# Patient Record
Sex: FEMALE | Race: WHITE | URBAN - METROPOLITAN AREA
[De-identification: names, ages, dates, MRNs, and addresses within clinical notes are randomized per-mention and may not be internally consistent; named-entity substitution may affect disease eponyms.]

---

## 2017-12-04 DIAGNOSIS — Z00.00 WELL WOMAN EXAM (NO GYNECOLOGICAL EXAM): Primary | ICD-10-CM

## 2017-12-14 ENCOUNTER — TELEPHONE (OUTPATIENT)
Dept: FAMILY MEDICINE CLINIC | Age: 52
End: 2017-12-14

## 2018-01-02 DIAGNOSIS — Z78.0 MENOPAUSE: ICD-10-CM

## 2018-01-03 DIAGNOSIS — Z00.00 WELL WOMAN EXAM (NO GYNECOLOGICAL EXAM): ICD-10-CM

## 2018-02-22 ENCOUNTER — OFFICE VISIT (OUTPATIENT)
Dept: FAMILY MEDICINE CLINIC | Age: 53
End: 2018-02-22

## 2018-02-22 VITALS
OXYGEN SATURATION: 99 % | HEART RATE: 59 BPM | TEMPERATURE: 98 F | RESPIRATION RATE: 17 BRPM | BODY MASS INDEX: 25.77 KG/M2 | HEIGHT: 69 IN | WEIGHT: 174 LBS | SYSTOLIC BLOOD PRESSURE: 134 MMHG | DIASTOLIC BLOOD PRESSURE: 67 MMHG

## 2018-02-22 DIAGNOSIS — Z78.0 MENOPAUSE: ICD-10-CM

## 2018-02-22 DIAGNOSIS — R09.82 POSTNASAL DRIP: ICD-10-CM

## 2018-02-22 DIAGNOSIS — Z23 ENCOUNTER FOR IMMUNIZATION: ICD-10-CM

## 2018-02-22 DIAGNOSIS — E66.3 OVERWEIGHT (BMI 25.0-29.9): ICD-10-CM

## 2018-02-22 DIAGNOSIS — Z00.00 WELL WOMAN EXAM (NO GYNECOLOGICAL EXAM): Primary | ICD-10-CM

## 2018-02-22 RX ORDER — FLUTICASONE PROPIONATE 50 MCG
1 SPRAY, SUSPENSION (ML) NASAL DAILY
Qty: 3 BOTTLE | Refills: 3 | Status: SHIPPED | OUTPATIENT
Start: 2018-02-22 | End: 2019-05-21

## 2018-02-22 RX ORDER — FLUTICASONE PROPIONATE 50 MCG
1 SPRAY, SUSPENSION (ML) NASAL DAILY
Qty: 3 BOTTLE | Refills: 3 | Status: SHIPPED | OUTPATIENT
Start: 2018-02-22 | End: 2018-02-22 | Stop reason: SDUPTHER

## 2018-02-22 NOTE — PROGRESS NOTES
Chief Complaint   Patient presents with    Annual Wellness Visit     No PAP    Medication Refill     Premarin        Pt is a 47y.o. year old female who presents for follow up of her chronic medical problems    Health Maintenance Due   Topic Date Due    DTaP/Tdap/Td series (1 - Tdap) 04/03/1985    Influenza Age 5 to Adult  08/01/2017-today     BMI 25  Wt Readings from Last 3 Encounters:   02/22/18 174 lb (78.9 kg)   11/21/16 170 lb (77.1 kg)   02/17/16 175 lb 6.4 oz (79.6 kg)     PAP done 2016, normal-due 2019  mammo due 5/2018    Fasting today for labs-labs done 12/2017 all normal; discussed  Lipid Complete Panel12/8/2017  [address]  Component Name Value Ref Range   Cholesterol 188 110 - 200 mg/dL   Triglyceride 75 40 - 149 mg/dL   HDL 86 (H) 40 - 59 mg/dL   LDL CALCULATION 87 50 - 99 mg/dL   VLDL CALCULATION 15        Colonoscopy up date    Needs refill on Premarin-needs to take it still, helps a lot with night sweats    On ASA for hx of TIA    New problems? Seeing PT for left LE pain  Continues to exercise regularly    Clearing her throat despite daily Zyrtec  Used to do Flonase      ROS:    Pt denies: Wt loss, Fever/Chills, HA, Visual changes, Fatigue, Chest pain, SOB, ZIMMERMAN, Abd pain, N/V/D/C, Blood in stool or urine, Edema. Pertinent positive as above in HPI. All others were negative    Patient Active Problem List   Diagnosis Code    Bursitis of shoulder, left M75.52    Stress fracture of ankle M84.373A    Hyperlipidemia E78.5    TIA (transient ischemic attack) G45.9    Rotator cuff syndrome of right shoulder M75. 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       Past Medical History:   Diagnosis Date    Arthritis     finger joints    Contact dermatitis and other eczema, due to unspecified cause     Hyperlipidemia 11/11/2013    Seizures ([address]     25 yrs ago / random    TIA (transient ischemic attack) 11/11/2013       Current Outpatient Prescriptions Medication Sig Dispense Refill    conjugated estrogens (PREMARIN) 0.625 mg tablet TAKE 1 TABLET DAILY 90 Tab 1    Cetirizine (ZYRTEC) 10 mg cap Take  by mouth.  melatonin 1 mg tablet Take 8 mg by mouth.  aspirin delayed-release 81 mg tablet Take  by mouth daily. History   Smoking Status    Former Smoker    Packs/day: 0.30    Years: 30.00    Types: Cigarettes   Smokeless Tobacco    Never Used       No Known Allergies    Patient Labs were reviewed: yes      Patient Past Records were reviewed:  yes        Objective:     Vitals:    02/22/18 0910   BP: 134/67   Pulse: (!) 59   Resp: 17   Temp: 98 °F (36.7 °C)   TempSrc: Oral   SpO2: 99%   Weight: 174 lb (78.9 kg)   Height: 5' 9\" (1.753 m)     Body mass index is 25.7 kg/(m^2). Exam:   Appearance: alert, well appearing,  oriented to person, place, and time, acyanotic, in no respiratory distress and well hydrated. HEENT:  NC/AT, pink conj, anicteric sclerae  Neck:  No cervical lymphadenopathy, no JVD, no thyromegaly, no carotid bruit  Heart:  RRR without M/R/G  Lungs:  CTAB, no rhonchi, rales, or wheezes with good air exchange   Abdomen:  Non-tender, pos bowel sounds, no hepatosplenomegaly  Ext:  No C/C/E    Skin: no rash  Neuro: no lateralizing signs, CNs II-XII intact      Assessment/ Plan:   Diagnoses and all orders for this visit:    1. Well woman exam (no gynecological exam)-Advised re: monthly self breast exam, dental prophylaxis Q 6 months, regular exercise, yearly eye exam, daily intake of Ca+D    2. Postnasal drip  -     fluticasone (FLONASE) 50 mcg/actuation nasal spray; 1 [place] by Both Nostrils route daily. 3. Overweight (BMI 25.0-29. 9)-continue with regular exercise    4.  Menopause  -     conjugated estrogens (PREMARIN) 0.625 mg tablet; TAKE 1 TABLET DAILY        Follow-up Disposition:  Return in about 1 year (around 2/22/2019) for physical.        I have discussed the diagnosis with the patient and the intended plan as seen in the above orders. The patient has received an After-Visit Summary and questions were answered concerning future plans. Medication Side Effects and Warnings were discussed with patient: yes    Patient verbalized understanding of above instructions.     Valentin Shannon MD  Internal Medicine  [facility]

## 2018-02-22 NOTE — PATIENT INSTRUCTIONS
Well Visit, Women 48 to 72: Care Instructions  Your Care Instructions    Physical exams can help you stay healthy. Your doctor has checked your overall health and may have suggested ways to take good care of yourself. He or she also may have recommended tests. At home, you can help prevent illness with healthy eating, regular exercise, and other steps. Follow-up care is a key part of your treatment and safety. Be sure to make and go to all appointments, and call your doctor if you are having problems. It's also a good idea to know your test results and keep a list of the medicines you take. How can you care for yourself at home? · Reach and stay at a healthy weight. This will lower your risk for many problems, such as obesity, diabetes, heart disease, and high blood pressure. · Get at least 30 minutes of exercise on most days of the week. Walking is a good choice. You also may want to do other activities, such as running, swimming, cycling, or playing tennis or team sports. · Do not smoke. Smoking can make health problems worse. If you need help quitting, talk to your doctor about stop-smoking programs and medicines. These can increase your chances of quitting for good. · Protect your skin from too much sun. When you're outdoors from 10 a.m. to 4 p.m., stay in the shade or cover up with clothing and a hat with a wide brim. Wear sunglasses that block UV rays. Even when it's cloudy, put broad-spectrum sunscreen (SPF 30 or higher) on any exposed skin. · See a dentist one or two times a year for checkups and to have your teeth cleaned. · Wear a seat belt in the car. · Limit alcohol to 1 drink a day. Too much alcohol can cause health problems. Follow your doctor's advice about when to have certain tests. These tests can spot problems early. · Cholesterol.  Your doctor will tell you how often to have this done based on your age, family history, or other things that can increase your risk for heart attack and stroke. · Blood pressure. Have your blood pressure checked during a routine doctor visit. Your doctor will tell you how often to check your blood pressure based on your age, your blood pressure results, and other factors. · Mammogram. Ask your doctor how often you should have a mammogram, which is an X-ray of your breasts. A mammogram can spot breast cancer before it can be felt and when it is easiest to treat. · Pap test and pelvic exam. Ask your doctor how often you should have a Pap test. You may not need to have a Pap test as often as you used to. · Vision. Have your eyes checked every year or two or as often as your doctor suggests. Some experts recommend that you have yearly exams for glaucoma and other age-related eye problems starting at age 48. · Hearing. Tell your doctor if you notice any change in your hearing. You can have tests to find out how well you hear. · Diabetes. Ask your doctor whether you should have tests for diabetes. · Colon cancer. You should begin tests for colon cancer at age 48. You may have one of several tests. Your doctor will tell you how often to have tests based on your age and risk. Risks include whether you already had a precancerous polyp removed from your colon or whether your parents, sisters and brothers, or children have had colon cancer. · Thyroid disease. Talk to your doctor about whether to have your thyroid checked as part of a regular physical exam. Women have an increased chance of a thyroid problem. · Osteoporosis. You should begin tests for bone density at age 72. If you are younger than 72, ask your doctor whether you have factors that may increase your risk for this disease. You may want to have this test before age 72. · Heart attack and stroke risk. At least every 4 to 6 years, you should have your risk for heart attack and stroke assessed.  Your doctor uses factors such as your age, blood pressure, cholesterol, and whether you smoke or have diabetes to show what your risk for a heart attack or stroke is over the next 10 years. When should you call for help? Watch closely for changes in your health, and be sure to contact your doctor if you have any problems or symptoms that concern you. Where can you learn more? Go to http://guilherme-maximus.info/. Enter T534 in the search box to learn more about \"Well Visit, Women 50 to 72: Care Instructions. \"  Current as of: May 12, 2017  Content Version: 11.4  © 0085-0734 Healthwise, Incorporated. Care instructions adapted under license by [employer] (which disclaims liability or warranty for this information). If you have questions about a medical condition or this instruction, always ask your healthcare professional. [address] any warranty or liability for your use of this information.

## 2018-02-22 NOTE — PROGRESS NOTES
Chief Complaint   Patient presents with    Annual Wellness Visit     No PAP    Medication Refill     Premarin      1. Have you been to the ER, urgent care clinic since your last visit? Hospitalized since your last visit? No    2. Have you seen or consulted any other health care providers outside of the [address] [place] [address] since your last visit? Include any pap smears or colon screening.  Yes Where: Dr. Linda Reyes Reason for visit: [facility]  [facility]

## 2018-02-22 NOTE — MR AVS SNAPSHOT
Sameer Copeland [place] [address] [facility] [address] [facility] [address] [id number] 
[phone] Patient: Tiffanie Limon MRN: [id number] DCJ:[id number] Visit Information Date & Time Provider Department Dept. Phone Encounter #  
 2/22/2018  9:00 AM Uyen Gilbert MD [facility] [address] [id number] Follow-up Instructions Return in about 1 year (around 2/22/2019) for physical.  
  
Upcoming Health Maintenance Date Due DTaP/Tdap/Td series (1 - Tdap) 4/3/1985 Influenza Age 5 to Adult 8/1/2017 BREAST CANCER SCRN MAMMOGRAM 5/15/2019 PAP AKA CERVICAL CYTOLOGY 11/21/2019 COLONOSCOPY 4/15/2020 Allergies as of 2/22/2018  Review Complete On: 2/22/2018 By: Uyen Gilbert MD  
 No Known Allergies Current Immunizations  Reviewed on 2/22/2018 Name Date Influenza Vaccine 1/30/2013 Influenza Vaccine (Quad) PF 11/21/2016 Influenza Vaccine PF 11/11/2013 Reviewed by Uyen Gilbert MD on 2/22/2018 at  9:38 AM  
You Were Diagnosed With   
  
 Codes Comments Well woman exam (no gynecological exam)    -  Primary ICD-10-CM: Z00.00 ICD-9-CM: V70.0 Postnasal drip     ICD-10-CM: R09.82 ICD-9-CM: 784.91 Overweight (BMI 25.0-29. 9)     ICD-10-CM: I32.9 ICD-9-CM: 278.02 Menopause     ICD-10-CM: Z78.0 ICD-9-CM: 627.2 Vitals BP Pulse Temp Resp Height(growth percentile) Weight(growth percentile) 134/67 (!) 59 98 °F (36.7 °C) (Oral) 17 5' 9\" (1.753 m) 174 lb (78.9 kg) LMP SpO2 BMI OB Status Smoking Status 11/16/2006 99% 25.7 kg/m2 Hysterectomy Former Smoker Vitals History BMI and BSA Data Body Mass Index Body Surface Area 25.7 kg/m 2 1.96 m 2 Preferred Pharmacy Pharmacy Name Phone [address] [phone] Your Updated Medication List  
  
   
This list is accurate as of 2/22/18  9:50 AM.  Always use your most recent med list.  
  
  
  
 aspirin delayed-release 81 mg tablet Take  by mouth daily. conjugated estrogens 0.625 mg tablet Commonly known as:  PREMARIN  
TAKE 1 TABLET DAILY  
  
 fluticasone 50 mcg/actuation nasal spray Commonly known as:  FLONASE  
1 [place] by Both Nostrils route daily. melatonin 1 mg tablet Take 8 mg by mouth. ZyrTEC 10 mg Cap Generic drug:  Cetirizine Take  by mouth. Prescriptions Printed Refills  
 conjugated estrogens (PREMARIN) 0.625 mg tablet 1 Sig: TAKE 1 TABLET DAILY Class: Print  
 fluticasone (FLONASE) 50 mcg/actuation nasal spray 3 [place] by Both Nostrils route daily. Class: Print Route: Both Nostrils Follow-up Instructions Return in about 1 year (around 2019) for physical.  
  
  
Patient Instructions Well Visit, Women 48 to 72: Care Instructions Your Care Instructions Physical exams can help you stay healthy. Your doctor has checked your overall health and may have suggested ways to take good care of yourself. He or she also may have recommended tests. At home, you can help prevent illness with healthy eating, regular exercise, and other steps. Follow-up care is a key part of your treatment and safety. Be sure to make and go to all appointments, and call your doctor if you are having problems. It's also a good idea to know your test results and keep a list of the medicines you take. How can you care for yourself at home? · Reach and stay at a healthy weight. This will lower your risk for many problems, such as obesity, diabetes, heart disease, and high blood pressure. · Get at least 30 minutes of exercise on most days of the week. Walking is a good choice. You also may want to do other activities, such as running, swimming, cycling, or playing tennis or team sports. · Do not smoke. Smoking can make health problems worse.  If you need help quitting, talk to your doctor about stop-smoking programs and medicines. These can increase your chances of quitting for good. · Protect your skin from too much sun. When you're outdoors from 10 a.m. to 4 p.m., stay in the shade or cover up with clothing and a hat with a wide brim. Wear sunglasses that block UV rays. Even when it's cloudy, put broad-spectrum sunscreen (SPF 30 or higher) on any exposed skin. · See a dentist one or two times a year for checkups and to have your teeth cleaned. · Wear a seat belt in the car. · Limit alcohol to 1 drink a day. Too much alcohol can cause health problems. Follow your doctor's advice about when to have certain tests. These tests can spot problems early. · Cholesterol. Your doctor will tell you how often to have this done based on your age, family history, or other things that can increase your risk for heart attack and stroke. · Blood pressure. Have your blood pressure checked during a routine doctor visit. Your doctor will tell you how often to check your blood pressure based on your age, your blood pressure results, and other factors. · Mammogram. Ask your doctor how often you should have a mammogram, which is an X-ray of your breasts. A mammogram can spot breast cancer before it can be felt and when it is easiest to treat. · Pap test and pelvic exam. Ask your doctor how often you should have a Pap test. You may not need to have a Pap test as often as you used to. · Vision. Have your eyes checked every year or two or as often as your doctor suggests. Some experts recommend that you have yearly exams for glaucoma and other age-related eye problems starting at age 48. · Hearing. Tell your doctor if you notice any change in your hearing. You can have tests to find out how well you hear. · Diabetes. Ask your doctor whether you should have tests for diabetes. · Colon cancer. You should begin tests for colon cancer at age 48.  You may have one of several tests. Your doctor will tell you how often to have tests based on your age and risk. Risks include whether you already had a precancerous polyp removed from your colon or whether your parents, sisters and brothers, or children have had colon cancer. · Thyroid disease. Talk to your doctor about whether to have your thyroid checked as part of a regular physical exam. Women have an increased chance of a thyroid problem. · Osteoporosis. You should begin tests for bone density at age 72. If you are younger than 72, ask your doctor whether you have factors that may increase your risk for this disease. You may want to have this test before age 72. · Heart attack and stroke risk. At least every 4 to 6 years, you should have your risk for heart attack and stroke assessed. Your doctor uses factors such as your age, blood pressure, cholesterol, and whether you smoke or have diabetes to show what your risk for a heart attack or stroke is over the next 10 years. When should you call for help? Watch closely for changes in your health, and be sure to contact your doctor if you have any problems or symptoms that concern you. Where can you learn more? Go to http://guilherme-maximus.info/. Enter S782 in the search box to learn more about \"Well Visit, Women 50 to 72: Care Instructions. \" Current as of: May 12, 2017 Content Version: 11.4 © 2424-5841 [employer]. Care instructions adapted under license by [employer] (which disclaims liability or warranty for this information). If you have questions about a medical condition or this instruction, always ask your healthcare professional. [place] [address] any warranty or liability for your use of this information. Introducing [facility]! Dear Flaquita Knowles: Thank you for requesting a [employer] account. Our records indicate that you already have an active [employer] account.   You can access your account anytime at [email or web address] Did you know that you can access your hospital and ER discharge instructions at any time in Blossom? You can also review all of your test results from your hospital stay or ER visit. Additional Information If you have questions, please visit the Frequently Asked Questions section of the Blossom website at [email or web address] Remember, Blossom is NOT to be used for urgent needs. For medical emergencies, dial 911. Now available from your iPhone and Android! Please provide this summary of care documentation to your next provider. Your primary care clinician is listed as Adenike Ratliff. If you have any questions after today's visit, please call [phone].

## 2018-09-19 DIAGNOSIS — Z78.0 MENOPAUSE: ICD-10-CM

## 2018-09-19 RX ORDER — ESTROGENS, CONJUGATED 0.62 MG/1
TABLET, FILM COATED ORAL
Qty: 90 TAB | Refills: 1 | Status: SHIPPED | OUTPATIENT
Start: 2018-09-19 | End: 2019-04-01 | Stop reason: SDUPTHER

## 2018-10-17 ENCOUNTER — OFFICE VISIT (OUTPATIENT)
Dept: FAMILY MEDICINE CLINIC | Age: 53
End: 2018-10-17

## 2018-10-17 DIAGNOSIS — N89.8 VAGINAL LESION: ICD-10-CM

## 2018-10-17 RX ORDER — VALACYCLOVIR HYDROCHLORIDE 500 MG/1
500 TABLET, FILM COATED ORAL 2 TIMES DAILY
Qty: 6 TAB | Refills: 2 | Status: SHIPPED | OUTPATIENT
Start: 2018-10-17 | End: 2018-10-20

## 2019-04-01 DIAGNOSIS — Z78.0 MENOPAUSE: ICD-10-CM

## 2019-04-01 NOTE — TELEPHONE ENCOUNTER
Last appt was 10-17-18 with you  Next appt is 5-21-19 with you    Last refill of this medication was 9-

## 2019-05-21 ENCOUNTER — OFFICE VISIT (OUTPATIENT)
Dept: FAMILY MEDICINE CLINIC | Age: 54
End: 2019-05-21

## 2019-05-21 VITALS
WEIGHT: 174 LBS | BODY MASS INDEX: 25.77 KG/M2 | OXYGEN SATURATION: 100 % | SYSTOLIC BLOOD PRESSURE: 121 MMHG | HEART RATE: 60 BPM | DIASTOLIC BLOOD PRESSURE: 80 MMHG | TEMPERATURE: 97.6 F | HEIGHT: 69 IN | RESPIRATION RATE: 17 BRPM

## 2019-05-21 DIAGNOSIS — E66.3 OVERWEIGHT (BMI 25.0-29.9): ICD-10-CM

## 2019-05-21 DIAGNOSIS — M25.562 LEFT KNEE PAIN, UNSPECIFIED CHRONICITY: ICD-10-CM

## 2019-05-21 DIAGNOSIS — Z00.00 WELL WOMAN EXAM (NO GYNECOLOGICAL EXAM): Primary | ICD-10-CM

## 2019-05-21 DIAGNOSIS — E89.40 SURGICAL MENOPAUSE ON HORMONE REPLACEMENT THERAPY: ICD-10-CM

## 2019-05-21 DIAGNOSIS — Z79.890 SURGICAL MENOPAUSE ON HORMONE REPLACEMENT THERAPY: ICD-10-CM

## 2019-05-21 DIAGNOSIS — Z12.39 SCREENING FOR BREAST CANCER: ICD-10-CM

## 2019-05-21 NOTE — PROGRESS NOTES
Chief Complaint   Patient presents with   [address] [facility] [address] Annual Wellness Visit     Patient not fasting    Medication Refill     Premarin    Knee Injury     Would like MRI       Pt is a 53y.o. year old female who presents for follow up of her chronic medical problems/annual wellness visit    On Premarin since her 41's  Hysterectomy for ovarian cysts-in her 41's  Dose reduced since last year-tried to go off but has recurrence of  and hot flashes      Not fasting today for labs    Health Maintenance Due   Topic Date Due    Shingrix Vaccine Age 49> (1 of 2)-advised to get this at at her pharmacy 04/03/2014    [address] [facility] MAMMOGRAM -at [address] 05/15/2019   maternal grandmother with breast cancer  Colonoscopy is up to date    ?hx of TIA-  Took herself off of ASA    Seen at ER-fell on her knee on the concrete at a parking lot 2 months ago; still in pain [place] when she has to go up and down steps  KNEE 3 VIEWS LT3/20/2019  [address]  Result Impression     No acute osseous findings. Degenerative changes as above. Wt Readings from Last 3 Encounters:   05/21/19 174 lb (78.9 kg)   02/22/18 174 lb (78.9 kg)   11/21/16 170 lb (77.1 kg)     Derm-urea (urea 45% nail gel) for nails that has vertical ridging; Dr Feliciana Spatz [facility] [address]); has helped    ROS:    Pt denies: Wt loss, Fever/Chills, HA, Visual changes, Fatigue, Chest pain, SOB, ZIMMERMAN, Abd pain, N/V/D/C, Blood in stool or urine, Edema. Pertinent positive as above in HPI. All others were negative    Patient Active Problem List   Diagnosis Code    Bursitis of shoulder, left M75.52    Stress fracture of ankle M84.373A    Hyperlipidemia E78.5    TIA (transient ischemic attack) G45.9    Rotator cuff syndrome of right shoulder M75. 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       Past Medical History:   Diagnosis Date    Arthritis     finger joints    Contact dermatitis and other eczema, due to unspecified cause     Hyperlipidemia 11/11/2013    Seizures ([address]     25 yrs ago / random    TIA (transient ischemic attack) 11/11/2013       Current Outpatient Medications   Medication Sig Dispense Refill    conjugated estrogens (PREMARIN) 0.625 mg tablet TAKE 1 TABLET DAILY 90 Tab 0    Cetirizine (ZYRTEC) 10 mg cap Take  by mouth. Social History     Tobacco Use   Smoking Status Former Smoker    Packs/day: 0.30    Years: 30.00    Pack years: 9.00    Types: Cigarettes   Smokeless Tobacco Never Used       No Known Allergies    Patient Labs were reviewed: yes      Patient Past Records were reviewed:  yes        Objective:     Vitals:    05/21/19 1143   BP: 121/80   Pulse: 60   Resp: 17   Temp: 97.6 °F (36.4 °C)   TempSrc: Oral   SpO2: 100%   Weight: 174 lb (78.9 kg)   Height: 5' 9\" (1.753 m)     Body mass index is 25.7 kg/m². Exam:   Appearance: alert, well appearing,  oriented to person, place, and time, acyanotic, in no respiratory distress and well hydrated. HEENT:  NC/AT, pink conj, anicteric sclerae  Neck:  No cervical lymphadenopathy, no JVD, no thyromegaly, no carotid bruit  Heart:  RRR without M/R/G  Lungs:  CTAB, no rhonchi, rales, or wheezes with good air exchange   Abdomen:  Non-tender, pos bowel sounds, no hepatosplenomegaly  Ext:  No C/C/E , mild crepitus on the left knee   Skin: no rash  Neuro: no lateralizing signs, CNs II-XII intact      Assessment/ Plan:   Diagnoses and all orders for this visit:    1. Well woman exam (no gynecological exam)-Advised re: monthly self breast exam, dental prophylaxis Q 6 months, regular exercise, yearly eye exam, daily intake of Ca+D  -     [facility] MAMMO BI SCREENING INCL CAD; Future  -     CBC WITH AUTOMATED DIFF; Future  -     METABOLIC PANEL, COMPREHENSIVE; Future  -     LIPID PANEL; Future    2.  Surgical menopause on hormone replacement therapy-will once again try to lower dose in an effort to get her off HRT; patient reminded of possible side effects of long term HRT including breast cancer  -     estrogens, conjugated, (PREMARIN) 0.45 mg tablet; Take 1 Tab by mouth daily. 3. Left knee pain, unspecified chronicity-persistent 2 months after trauma  -     MRI KNEE LT WO CONT; Future    4. Overweight (BMI 25.0-29. 9)-discussed limiting sruthi to 0069-7102/day and exercising at least 150 min a week    5. Screening for breast cancer  -     [facility] MAMMO BI SCREENING INCL CAD; Future        Follow-up and Dispositions    · Return in about 1 year (around 5/21/2020) for physical.             I have discussed the diagnosis with the patient and the intended plan as seen in the above orders. The patient has received an After-Visit Summary and questions were answered concerning future plans. Medication Side Effects and Warnings were discussed with patient: yes    Patient verbalized understanding of above instructions.     Concepcion Crisostomo MD  Internal Medicine  [facility]

## 2019-05-21 NOTE — PATIENT INSTRUCTIONS
Hormone Therapy (HT): Care Instructions Your Care Instructions Hormone therapy (HT) is medicine to treat symptoms of menopause, such as hot flashes, vaginal dryness, and sleep problems. It replaces the hormones that drop at menopause. Most women get relief from these symptoms within weeks of starting HT. HT contains two female hormones, estrogen and progestin. HT may come in the form of a pill, patch, gel, spray, or vaginal ring. A vaginal cream or a vaginal ring that has a much lower dose of estrogen may be used to relieve vaginal dryness only. HT has some risks. Most doctors recommend that women only take HT for as short a time as possible. This is to reduce the chances of heart disease, breast cancer, blood clots, and stroke that may be connected to HT. Be sure to have regular checkups with your doctor when taking HT. Talk with your doctor about whether HT is right for you. If you decide that the benefits of HT outweigh the risks, ask your doctor to prescribe the lowest effective dose for as short a time as possible. Follow-up care is a key part of your treatment and safety. Be sure to make and go to all appointments, and call your doctor if you are having problems. It's also a good idea to know your test results and keep a list of the medicines you take. Why might you take HT? 
· HT reduces symptoms of menopause. These include hot flashes, mood swings, and sleep problems. · The estrogen in HT helps to prevent thinning bones. And it may lower the chance of colon cancer. · HT helps keep the lining of the vagina moist and thick. This can reduce irritation. · HT helps protect against dental problems, such as tooth loss and gum disease. What are the risks of taking HT? · Some women who take HT may have vaginal bleeding, bloating, nausea, sore breasts, mood swings, and headaches. Talk to your doctor about changing the type of HT you take or lowering the dose.  This may help to end these side effects. · Taking HT may slightly increase your risk for heart disease, breast cancer, ovarian cancer, blood clots, and stroke. · You should not take HT if you: 
? Could be pregnant. ? Have a personal history of breast cancer, endometrial cancer, pulmonary embolism, deep vein thrombosis, heart attack, or stroke. ? Have vaginal bleeding from an unknown cause. ? Have active liver disease. What can you do to reduce the symptoms of menopause? · Eat healthy foods and get regular exercise. This also will help to maintain strong bones and a healthy heart. · Do not smoke. If you smoke, you can reduce hot flashes and long-term health risks by stopping. If you need help quitting, talk to your doctor about stop-smoking programs and medicines. These can increase your chances of quitting for good. · Practice daily breathing exercises (meditation) to reduce hot flashes and mood swings. · Limit the amount of alcohol you drink. This can reduce symptoms of menopause and long-term health risks. · Keep your home and office cool. · Use a vaginal lubricant, such as Astroglide, Wet Gel Lubricant, or K-Y Jelly. · Do pelvic floor (Kegel) exercises, which tighten and strengthen pelvic muscles. To do Kegel exercises: 
? Squeeze the same muscles you would use to stop your urine. Your belly and thighs should not move. ? Hold the squeeze for 3 seconds, then relax for 3 seconds. ? Start with 3 seconds. Then add 1 second each week until you are able to squeeze for 10 seconds. ? Repeat the exercise 10 to 15 times a session. Do three or more sessions a day. Where can you learn more? Go to http://guilherme-maximus.info/. Enter [id number] in the search box to learn more about \"Hormone Therapy (HT): Care Instructions. \" Current as of: May 14, 2018 Content Version: 11.9 © 8044-3271 [employer].  Care instructions adapted under license by [employer] (which disclaims liability or warranty for this information). If you have questions about a medical condition or this instruction, always ask your healthcare professional. [place] [address] any warranty or liability for your use of this information.

## 2019-05-21 NOTE — PROGRESS NOTES
Chief Complaint   Patient presents with   [facility] Annual Wellness Visit     Patient not fasting    Medication Refill     Premarin    Knee Injury     Would like MRI       1. Have you been to the ER, urgent care clinic since your last visit? Hospitalized since your last visit? Yes Where: [facility]    2. Have you seen or consulted any other health care providers outside of the [address] [place] [address] since your last visit? Include any pap smears or colon screening.  No

## 2019-05-25 LAB
ALBUMIN SERPL-MCNC: 4.4 G/DL (ref 3.5–5.5)
ALBUMIN/GLOB SERPL: 2.2 {RATIO} (ref 1.2–2.2)
ALP SERPL-CCNC: 63 IU/L (ref 39–117)
ALT SERPL-CCNC: 17 IU/L (ref 0–32)
AST SERPL-CCNC: 22 IU/L (ref 0–40)
BASOPHILS # BLD AUTO: 0.1 X10E3/UL (ref 0–0.2)
BASOPHILS NFR BLD AUTO: 1 %
BILIRUB SERPL-MCNC: 1.1 MG/DL (ref 0–1.2)
BUN SERPL-MCNC: 15 MG/DL (ref 6–24)
BUN/CREAT SERPL: 21 (ref 9–23)
CALCIUM SERPL-MCNC: 9.8 MG/DL (ref 8.7–10.2)
CHLORIDE SERPL-SCNC: 101 MMOL/L (ref 96–106)
CHOLEST SERPL-MCNC: 177 MG/DL (ref 100–199)
CO2 SERPL-SCNC: 29 MMOL/L (ref 20–29)
CREAT SERPL-MCNC: 0.7 MG/DL (ref 0.57–1)
EOSINOPHIL # BLD AUTO: 0.2 X10E3/UL (ref 0–0.4)
EOSINOPHIL NFR BLD AUTO: 4 %
ERYTHROCYTE [DISTWIDTH] IN BLOOD BY AUTOMATED COUNT: 13.1 % (ref 12.3–15.4)
GLOBULIN SER CALC-MCNC: 2 G/DL (ref 1.5–4.5)
GLUCOSE SERPL-MCNC: 77 MG/DL (ref 65–99)
HCT VFR BLD AUTO: 41.6 % (ref 34–46.6)
HDLC SERPL-MCNC: 77 MG/DL
HGB BLD-MCNC: 14 G/DL (ref 11.1–15.9)
IMM GRANULOCYTES # BLD AUTO: 0 X10E3/UL (ref 0–0.1)
IMM GRANULOCYTES NFR BLD AUTO: 0 %
INTERPRETATION, 910389: NORMAL
LDLC SERPL CALC-MCNC: 90 MG/DL (ref 0–99)
LYMPHOCYTES # BLD AUTO: 1.9 X10E3/UL (ref 0.7–3.1)
LYMPHOCYTES NFR BLD AUTO: 35 %
MCH RBC QN AUTO: 30 PG (ref 26.6–33)
MCHC RBC AUTO-ENTMCNC: 33.7 G/DL (ref 31.5–35.7)
MCV RBC AUTO: 89 FL (ref 79–97)
MONOCYTES # BLD AUTO: 0.6 X10E3/UL (ref 0.1–0.9)
MONOCYTES NFR BLD AUTO: 11 %
NEUTROPHILS # BLD AUTO: 2.5 X10E3/UL (ref 1.4–7)
NEUTROPHILS NFR BLD AUTO: 49 %
PLATELET # BLD AUTO: 222 X10E3/UL (ref 150–450)
POTASSIUM SERPL-SCNC: 5.2 MMOL/L (ref 3.5–5.2)
PROT SERPL-MCNC: 6.4 G/DL (ref 6–8.5)
RBC # BLD AUTO: 4.67 X10E6/UL (ref 3.77–5.28)
SODIUM SERPL-SCNC: 143 MMOL/L (ref 134–144)
TRIGL SERPL-MCNC: 51 MG/DL (ref 0–149)
VLDLC SERPL CALC-MCNC: 10 MG/DL (ref 5–40)
WBC # BLD AUTO: 5.3 X10E3/UL (ref 3.4–10.8)

## 2019-06-18 DIAGNOSIS — Z12.39 SCREENING FOR BREAST CANCER: ICD-10-CM

## 2019-06-18 DIAGNOSIS — Z00.00 WELL WOMAN EXAM (NO GYNECOLOGICAL EXAM): ICD-10-CM

## 2019-06-26 DIAGNOSIS — M25.562 LEFT KNEE PAIN, UNSPECIFIED CHRONICITY: ICD-10-CM

## 2019-06-26 DIAGNOSIS — S83.282A ACUTE LATERAL MENISCUS TEAR OF LEFT KNEE, INITIAL ENCOUNTER: Primary | ICD-10-CM

## 2020-02-06 ENCOUNTER — OFFICE VISIT (OUTPATIENT)
Dept: FAMILY MEDICINE CLINIC | Age: 55
End: 2020-02-06

## 2020-02-06 VITALS
RESPIRATION RATE: 17 BRPM | OXYGEN SATURATION: 99 % | TEMPERATURE: 97 F | SYSTOLIC BLOOD PRESSURE: 109 MMHG | BODY MASS INDEX: 25.71 KG/M2 | HEIGHT: 69 IN | DIASTOLIC BLOOD PRESSURE: 73 MMHG | HEART RATE: 62 BPM | WEIGHT: 173.6 LBS

## 2020-02-06 DIAGNOSIS — Z12.11 SCREENING FOR COLON CANCER: ICD-10-CM

## 2020-02-06 DIAGNOSIS — J30.9 ALLERGIC RHINITIS: ICD-10-CM

## 2020-02-06 DIAGNOSIS — Z23 NEED FOR TDAP VACCINATION: ICD-10-CM

## 2020-02-06 DIAGNOSIS — Z79.890 SURGICAL MENOPAUSE ON HORMONE REPLACEMENT THERAPY: Primary | ICD-10-CM

## 2020-02-06 DIAGNOSIS — E89.40 SURGICAL MENOPAUSE ON HORMONE REPLACEMENT THERAPY: Primary | ICD-10-CM

## 2020-02-06 DIAGNOSIS — K63.5 HYPERPLASTIC COLONIC POLYP, UNSPECIFIED PART OF COLON: ICD-10-CM

## 2020-02-06 DIAGNOSIS — E66.3 OVERWEIGHT (BMI 25.0-29.9): ICD-10-CM

## 2020-02-06 NOTE — PATIENT INSTRUCTIONS
Hormone Therapy (HT): Care Instructions Your Care Instructions Hormone therapy (HT) is medicine to treat symptoms of menopause, such as hot flashes, vaginal dryness, and sleep problems. It replaces the hormones that drop at menopause. Most women get relief from these symptoms within weeks of starting HT. HT contains two female hormones, estrogen and progestin. HT may come in the form of a pill, patch, gel, spray, or vaginal ring. A vaginal cream or a vaginal ring that has a much lower dose of estrogen may be used to relieve vaginal dryness only. HT has some risks. Most doctors recommend that women only take HT for as short a time as possible. This is to reduce the chances of heart disease, breast cancer, blood clots, and stroke that may be connected to HT. Be sure to have regular checkups with your doctor when taking HT. Talk with your doctor about whether HT is right for you. If you decide that the benefits of HT outweigh the risks, ask your doctor to prescribe the lowest effective dose for as short a time as possible. Follow-up care is a key part of your treatment and safety. Be sure to make and go to all appointments, and call your doctor if you are having problems. It's also a good idea to know your test results and keep a list of the medicines you take. Why might you take HT? 
· HT reduces symptoms of menopause. These include hot flashes, mood swings, and sleep problems. · The estrogen in HT helps to prevent thinning bones. And it may lower the chance of colon cancer. · HT helps keep the lining of the vagina moist and thick. This can reduce irritation. · HT helps protect against dental problems, such as tooth loss and gum disease. What are the risks of taking HT? · Some women who take HT may have vaginal bleeding, bloating, nausea, sore breasts, mood swings, and headaches. Talk to your doctor about changing the type of HT you take or lowering the dose.  This may help to end these side effects. · Taking HT may slightly increase your risk for heart disease, breast cancer, ovarian cancer, blood clots, and stroke. · You should not take HT if you: 
? Could be pregnant. ? Have a personal history of breast cancer, endometrial cancer, pulmonary embolism, deep vein thrombosis, heart attack, or stroke. ? Have vaginal bleeding from an unknown cause. ? Have active liver disease. What can you do to reduce the symptoms of menopause? · Eat healthy foods and get regular exercise. This also will help to maintain strong bones and a healthy heart. · Do not smoke. If you smoke, you can reduce hot flashes and long-term health risks by stopping. If you need help quitting, talk to your doctor about stop-smoking programs and medicines. These can increase your chances of quitting for good. · Practice daily breathing exercises (meditation) to reduce hot flashes and mood swings. · Limit the amount of alcohol you drink. This can reduce symptoms of menopause and long-term health risks. · Keep your home and office cool. · Use a vaginal lubricant, such as Astroglide, Wet Gel Lubricant, or K-Y Jelly. · Do pelvic floor (Kegel) exercises, which tighten and strengthen pelvic muscles. To do Kegel exercises: 
? Squeeze the same muscles you would use to stop your urine. Your belly and thighs should not move. ? Hold the squeeze for 3 seconds, then relax for 3 seconds. ? Start with 3 seconds. Then add 1 second each week until you are able to squeeze for 10 seconds. ? Repeat the exercise 10 to 15 times a session. Do three or more sessions a day. Where can you learn more? Go to http://guilherme-maximus.info/. Enter [id number] in the search box to learn more about \"Hormone Therapy (HT): Care Instructions. \" Current as of: February 19, 2019 Content Version: 12.2 © 6301-2723 [employer].  Care instructions adapted under license by [employer] (which disclaims liability or warranty for this information). If you have questions about a medical condition or this instruction, always ask your healthcare professional. [place] [address] any warranty or liability for your use of this information.

## 2020-02-06 NOTE — PROGRESS NOTES
Chief Complaint   Patient presents with   [facility] Annual Wellness Visit     Patient not fasting     1. Have you been to the ER, urgent care clinic since your last visit? Hospitalized since your last visit? Yes Where: [facility]    2. Have you seen or consulted any other health care providers outside of the [address] [place] [address] since your last visit? Include any pap smears or colon screening. Yes Where: [address] [facility]    Patient declined influenza vaccine.

## 2020-02-06 NOTE — PROGRESS NOTES
Chief Complaint   Patient presents with    Follow Up Chronic Condition     Patient not fasting       Pt is a 53y.o. year old female who presents for follow up of her chronic medical problems    Wt Readings from Last 3 Encounters:   02/06/20 173 lb 9.6 oz (78.7 kg)   05/21/19 174 lb (78.9 kg)   02/22/18 174 lb (78.9 kg)     Hysterectomy  Premarin dose lowered last visit and tolerating it well; only gets the hot flashes at times when he drinks wine  Willing to lower dose again      Health Maintenance Due   Topic Date Due    Influenza Age 5 to Adult -declined 08/01/2019    PAP AKA CERVICAL CYTOLOGY -hyst 11/21/2019    DTaP/Tdap/Td series (2 - Td)-today 12/31/2019    Colonoscopy -will do cologuard/previous colonoscopy reviewed-hyperplastic polyp; no fam hx of colon cancer and no bowel changes recently 04/15/2020     Not fasting  Ate breakfast 4 hrs ago      ROS:    Pt denies: Wt loss, Fever/Chills, HA, Visual changes, Fatigue, Chest pain, SOB, ZIMMERMAN, Abd pain, N/V/D/C, Blood in stool or urine, Edema. Pertinent positive as above in HPI. All others were negative    Patient Active Problem List   Diagnosis Code    Bursitis of shoulder, left M75.52    Stress fracture of ankle M84.373A    Hyperlipidemia E78.5    TIA (transient ischemic attack) G45.9    Rotator cuff syndrome of right shoulder M75. 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       Past Medical History:   Diagnosis Date    Arthritis     finger joints    Contact dermatitis and other eczema, due to unspecified cause     Hyperlipidemia 11/11/2013    Seizures ([facility] [address]     25 yrs ago / random    TIA (transient ischemic attack) 11/11/2013       Current Outpatient Medications   Medication Sig Dispense Refill    conjugated estrogens (PREMARIN) 0.45 mg tablet Take 1 Tab by mouth daily. 90 Tab 2    Cetirizine (ZYRTEC) 10 mg cap Take  by mouth.          Social History     Tobacco Use   Smoking Status Former Smoker    Packs/day: 0.30    Years: 30.00    Pack years: 9.00    Types: Cigarettes   Smokeless Tobacco Never Used       No Known Allergies    Patient Labs were reviewed: yes      Patient Past Records were reviewed:  yes        Objective:     Vitals:    02/06/20 1242   BP: 109/73   Pulse: 62   Resp: 17   Temp: 97 °F (36.1 °C)   TempSrc: Oral   SpO2: 99%   Weight: 173 lb 9.6 oz (78.7 kg)   Height: 5' 9\" (1.753 m)     Body mass index is 25.64 kg/m². Exam:   Appearance: alert, well appearing,  oriented to person, place, and time, acyanotic, in no respiratory distress and well hydrated. HEENT:  NC/AT, pink conj, anicteric sclerae  Neck:  No cervical lymphadenopathy, no JVD, no thyromegaly, no carotid bruit  Heart:  RRR without M/R/G  Lungs:  CTAB, no rhonchi, rales, or wheezes with good air exchange   Abdomen:  Non-tender, pos bowel sounds, no hepatosplenomegaly  Ext:  No C/C/E    Skin: no rash  Neuro: no lateralizing signs, CNs II-XII intact      Assessment/ Plan:   Diagnoses and all orders for this visit:    1. Surgical menopause on hormone replacement therapy-will continue to taper off Premarin  -     METABOLIC PANEL, COMPREHENSIVE; Future  -     conjugated estrogens (PREMARIN) 0.3 mg tablet; Take 1 Tab by mouth daily. 2. Hyperplastic colonic polyp, unspecified part of colon-can do cologuard    3. Allergic rhinitis-on prn Zyrtec  -     CBC WITH AUTOMATED DIFF; Future    4. Overweight (BMI 25.0-29. 9)-discussed limiting sruthi to 2061-6432/day and exercising at least 150 min a week  -     METABOLIC PANEL, COMPREHENSIVE; Future  -     LIPID PANEL; Future    5. Need for Tdap vaccination  -     TETANUS, DIPHTHERIA TOXOIDS AND ACELLULAR PERTUSSIS VACCINE (TDAP), IN INDIVIDS. >=7, IM    6. Screening for colon cancer  -     COLOGUARD TEST (FECAL DNA COLORECTAL CANCER SCREENING)    Follow-up and Dispositions    · Return in about 6 months (around 8/6/2020) for follow up.            I have discussed the diagnosis with the patient and the intended plan as seen in the above orders. The patient has received an After-Visit Summary and questions were answered concerning future plans. Medication Side Effects and Warnings were discussed with patient: yes    Patient verbalized understanding of above instructions.     Colonel Donavan MD  Internal Medicine  [facility]

## 2020-02-06 NOTE — PROGRESS NOTES
After obtaining consent, and per orders of Dr. Chauncey Orozco, injection of TDAP given by Aubree Hernandez LPN. Patient tolerated injection well, and to instructed report any adverse reaction to me immediately. Patient observed for 10 minutes. No signs nor symptoms of adverse reactions noted.

## 2020-02-07 LAB
ALBUMIN SERPL-MCNC: 4.4 G/DL (ref 3.8–4.9)
ALBUMIN/GLOB SERPL: 2.3 {RATIO} (ref 1.2–2.2)
ALP SERPL-CCNC: 66 IU/L (ref 39–117)
ALT SERPL-CCNC: 13 IU/L (ref 0–32)
AST SERPL-CCNC: 20 IU/L (ref 0–40)
BASOPHILS # BLD AUTO: 0.1 X10E3/UL (ref 0–0.2)
BASOPHILS NFR BLD AUTO: 1 %
BILIRUB SERPL-MCNC: 0.8 MG/DL (ref 0–1.2)
BUN SERPL-MCNC: 16 MG/DL (ref 6–24)
BUN/CREAT SERPL: 24 (ref 9–23)
CALCIUM SERPL-MCNC: 9.7 MG/DL (ref 8.7–10.2)
CHLORIDE SERPL-SCNC: 101 MMOL/L (ref 96–106)
CHOLEST SERPL-MCNC: 179 MG/DL (ref 100–199)
CO2 SERPL-SCNC: 24 MMOL/L (ref 20–29)
CREAT SERPL-MCNC: 0.67 MG/DL (ref 0.57–1)
EOSINOPHIL # BLD AUTO: 0.2 X10E3/UL (ref 0–0.4)
EOSINOPHIL NFR BLD AUTO: 2 %
ERYTHROCYTE [DISTWIDTH] IN BLOOD BY AUTOMATED COUNT: 11.7 % (ref 11.7–15.4)
GLOBULIN SER CALC-MCNC: 1.9 G/DL (ref 1.5–4.5)
GLUCOSE SERPL-MCNC: 78 MG/DL (ref 65–99)
HCT VFR BLD AUTO: 44 % (ref 34–46.6)
HDLC SERPL-MCNC: 71 MG/DL
HGB BLD-MCNC: 14.6 G/DL (ref 11.1–15.9)
IMM GRANULOCYTES # BLD AUTO: 0 X10E3/UL (ref 0–0.1)
IMM GRANULOCYTES NFR BLD AUTO: 0 %
INTERPRETATION, 910389: NORMAL
LDLC SERPL CALC-MCNC: 96 MG/DL (ref 0–99)
LYMPHOCYTES # BLD AUTO: 2.6 X10E3/UL (ref 0.7–3.1)
LYMPHOCYTES NFR BLD AUTO: 36 %
MCH RBC QN AUTO: 29.2 PG (ref 26.6–33)
MCHC RBC AUTO-ENTMCNC: 33.2 G/DL (ref 31.5–35.7)
MCV RBC AUTO: 88 FL (ref 79–97)
MONOCYTES # BLD AUTO: 0.7 X10E3/UL (ref 0.1–0.9)
MONOCYTES NFR BLD AUTO: 9 %
NEUTROPHILS # BLD AUTO: 3.8 X10E3/UL (ref 1.4–7)
NEUTROPHILS NFR BLD AUTO: 52 %
PLATELET # BLD AUTO: 222 X10E3/UL (ref 150–450)
POTASSIUM SERPL-SCNC: 4.5 MMOL/L (ref 3.5–5.2)
PROT SERPL-MCNC: 6.3 G/DL (ref 6–8.5)
RBC # BLD AUTO: 5 X10E6/UL (ref 3.77–5.28)
SODIUM SERPL-SCNC: 143 MMOL/L (ref 134–144)
TRIGL SERPL-MCNC: 62 MG/DL (ref 0–149)
VLDLC SERPL CALC-MCNC: 12 MG/DL (ref 5–40)
WBC # BLD AUTO: 7.3 X10E3/UL (ref 3.4–10.8)

## 2020-03-27 ENCOUNTER — VIRTUAL VISIT (OUTPATIENT)
Dept: FAMILY MEDICINE CLINIC | Age: 55
End: 2020-03-27

## 2020-03-27 DIAGNOSIS — J06.9 VIRAL UPPER RESPIRATORY TRACT INFECTION: Primary | ICD-10-CM

## 2020-03-27 RX ORDER — UREA 10 %
100 LOTION (ML) TOPICAL DAILY
COMMUNITY

## 2020-03-27 RX ORDER — BIOTIN 1000 MCG
TABLET,CHEWABLE ORAL 2 TIMES DAILY
COMMUNITY

## 2020-03-27 RX ORDER — DEXTROMETHORPHAN HYDROBROMIDE, GUAIFENESIN 20; 400 MG/20ML; MG/20ML
10 SOLUTION ORAL EVERY 6 HOURS
Qty: 237 ML | Refills: 0 | Status: SHIPPED | OUTPATIENT
Start: 2020-03-27 | End: 2021-03-10 | Stop reason: ALTCHOICE

## 2020-03-27 NOTE — PROGRESS NOTES
Mansi Sanchez is a 53 y.o. female and presents with Cough (with light green phlegm and worst at night and a little SOB. ) and Nasal Congestion (fir about x5days. Took OTC Mucinex and zyrtec. )       Subjective:    Cough- with light green phlegm and chest heaviness. No fever. Feels \"pretty good\" for long stretches. For about 6 days. Having difficulty sleeping at night due to the cough. No wheezes or shortness of breath    Assessment/Plan:        Coughmost consistent with viral upper respiratory infectiondiscussed over-the-counter medications and Robitussin-DM sent in to use for daytime. Discussed switching to NyQuil or adding Benadryl to this for nighttime use to achieve better sleep. She will call for lack of improvement or any new symptoms. We reviewed there is no need for antibiotics at this point but if her symptoms stretch on for 10 to 14 days would consider adding 1 then. Additionally she will go to a [facility] 19 screening clinic for fevers or myalgias or shortness of breath    Diagnoses and all orders for this visit:    1. Viral upper respiratory tract infection    Other orders  -     dextromethorphan-guaiFENesin (Robitussin Cough-Chest Hugo DM) 5-100 mg/5 mL liqd; Take 10 mL by mouth every six (6) hours. RTC prn  Orders Placed This Encounter    biotin 1,000 mcg chew     Sig: Take  by mouth two (2) times a day.  cyanocobalamin (VITAMIN B12) 100 mcg tablet     Si mcg daily. B12 liquid daily    dextromethorphan-guaiFENesin (Robitussin Cough-Chest Hugo DM) 5-100 mg/5 mL liqd     Sig: Take 10 mL by mouth every six (6) hours. Dispense:  237 mL     Refill:  0               ROS:  Negative except as mentioned above  Cardiac- no chest pain or palpitations  Pulmonary- no sob or wheezes  GI- no n/v or diarrhea.       SH:  Social History     Tobacco Use    Smoking status: Former Smoker     Packs/day: 0.30     Years: 30.00     Pack years: 9.00     Types: Cigarettes    Smokeless tobacco: Never Used   Substance Use Topics    Alcohol use: Yes     Alcohol/week: 0.0 standard drinks     Comment: socially    Drug use: Yes     Types: Marijuana     Comment: marijuana in distant past         Medications/Allergies:  Current Outpatient Medications on File Prior to Visit   Medication Sig Dispense Refill    biotin 1,000 mcg chew Take  by mouth two (2) times a day.  cyanocobalamin (VITAMIN B12) 100 mcg tablet 100 mcg daily. B12 liquid daily      conjugated estrogens (PREMARIN) 0.3 mg tablet Take 1 Tab by mouth daily. 90 Tab 2    Cetirizine (ZYRTEC) 10 mg cap Take  by mouth. No current facility-administered medications on file prior to visit. No Known Allergies        Briseida Sena is a 53 y.o. female who was seen by synchronous (real-time) audio-video technology on 3/27/2020. Consent:  She and/or her healthcare decision maker is aware that this patient-initiated Telehealth encounter is a billable service, with coverage as determined by her insurance carrier. She is aware that she may receive a bill and has provided verbal consent to proceed: Yes    I was in the office while conducting this encounter. Assessment & Plan:   Diagnoses and all orders for this visit:    1. Viral upper respiratory tract infection    Other orders  -     dextromethorphan-guaiFENesin (Robitussin Cough-Chest Hugo DM) 5-100 mg/5 mL liqd; Take 10 mL by mouth every six (6) hours. Coding Help - Use CPT Codes 96601-18267, 93920-47876 for Established and New Patients respectively, either employing EM elements or Time rules. Other codes (example consult codes) may also apply. 712  Subjective:   Briseida Sena was seen for Cough (with light green phlegm and worst at night and a little SOB. ) and Nasal Congestion (fir about x5days. Took OTC Mucinex and zyrtec. )      Prior to Admission medications    Medication Sig Start Date End Date Taking?  Authorizing Provider   biotin 1,000 mcg chew Take  by mouth two (2) times a day. Yes Provider, Historical   cyanocobalamin (VITAMIN B12) 100 mcg tablet 100 mcg daily. B12 liquid daily   Yes Provider, Historical   conjugated estrogens (PREMARIN) 0.3 mg tablet Take 1 Tab by mouth daily. 2/6/20  Yes Sanjay Dutton MD   Cetirizine (ZYRTEC) 10 mg cap Take  by mouth. Yes Provider, Historical     No Known Allergies    Patient Active Problem List   Diagnosis Code    Bursitis of shoulder, left M75.52    Stress fracture of ankle M84.373A    Hyperlipidemia E78.5    TIA (transient ischemic attack) G45.9    Rotator cuff syndrome of right shoulder M75. 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     Patient Active Problem List    Diagnosis Date Noted    Hyperplastic polyp of large intestine 11/21/2016    Mixed incontinence 09/01/2015    Hyperlipidemia 11/11/2013    TIA (transient ischemic attack) 11/11/2013    Rotator cuff syndrome of right shoulder 11/11/2013    Bursitis of shoulder, left 01/30/2013    Stress fracture of ankle 01/30/2013     Current Outpatient Medications   Medication Sig Dispense Refill    biotin 1,000 mcg chew Take  by mouth two (2) times a day.  cyanocobalamin (VITAMIN B12) 100 mcg tablet 100 mcg daily. B12 liquid daily      dextromethorphan-guaiFENesin (Robitussin Cough-Chest Hugo DM) 5-100 mg/5 mL liqd Take 10 mL by mouth every six (6) hours. 237 mL 0    conjugated estrogens (PREMARIN) 0.3 mg tablet Take 1 Tab by mouth daily. 90 Tab 2    Cetirizine (ZYRTEC) 10 mg cap Take  by mouth.        No Known Allergies  Past Medical History:   Diagnosis Date    Arthritis     finger joints    Contact dermatitis and other eczema, due to unspecified cause     Hyperlipidemia 11/11/2013    Seizures ([address]     25 yrs ago / random    TIA (transient ischemic attack) 11/11/2013     Past Surgical History:   Procedure Laterality Date    HX GYN      cyst removed / both ovaries/ hysterectomy    HX LIPOSUCTION      HX ORTHOPAEDIC      pin her right foot    HX ORTHOPAEDIC  03/2014    partical rotater cuff tear and bones spur surgery     Family History   Problem Relation Age of Onset    Hypertension Mother     Diabetes Mother     High Cholesterol Mother     Heart Disease Father      Social History     Tobacco Use    Smoking status: Former Smoker     Packs/day: 0.30     Years: 30.00     Pack years: 9.00     Types: Cigarettes    Smokeless tobacco: Never Used   Substance Use Topics    Alcohol use:  Yes     Alcohol/week: 0.0 standard drinks     Comment: socially       ROS    PHYSICAL EXAMINATION:  [ INSTRUCTIONS:  \"[x]\" Indicates a positive item  \"[]\" Indicates a negative item  -- DELETE ALL ITEMS NOT EXAMINED]  Vital Signs: (As obtained by patient/caregiver at home)  Visit Vitals  LMP 11/16/2006        Constitutional: [x] Appears well-developed and well-nourished [x] No apparent distress      [] Abnormal -     Mental status: [x] Alert and awake  [x] Oriented to person/place/time [x] Able to follow commands    [] Abnormal -     Eyes:   EOM    [x]  Normal    [] Abnormal -   Sclera  [x]  Normal    [] Abnormal -          Discharge [x]  None visible   [] Abnormal -     HENT: [x] Normocephalic, atraumatic  [] Abnormal -   [x] Mouth/Throat: Mucous membranes are moist    External Ears [x] Normal  [] Abnormal -    Neck: [x] No visualized mass [] Abnormal -     Pulmonary/Chest: [x] Respiratory effort normal   [x] No visualized signs of difficulty breathing or respiratory distress        [] Abnormal -      Musculoskeletal:   [x] Normal gait with no signs of ataxia         [x] Normal range of motion of neck        [] Abnormal -     Neurological:        [x] No Facial Asymmetry (Cranial nerve 7 motor function) (limited exam due to video visit)          [x] No gaze palsy        [] Abnormal -          Skin:        [x] No significant exanthematous lesions or discoloration noted on facial skin         [] Abnormal -            Psychiatric: [x] Normal Affect [] Abnormal -        [x] No Hallucinations    Other pertinent observable physical exam findings:-        We discussed the expected course, resolution and complications of the diagnosis(es) in detail. Medication risks, benefits, costs, interactions, and alternatives were discussed as indicated. I advised her to contact the office if her condition worsens, changes or fails to improve as anticipated. She expressed understanding with the diagnosis(es) and plan. Pursuant to the emergency declaration under the [address] [place] [address], [facility] waiver authority and the [employer] Act, this Virtual  Visit was conducted, with patient's consent, to reduce the patient's risk of exposure to COVID-19 and provide continuity of care for an established patient. Services were provided through a video synchronous discussion virtually to substitute for in-person clinic visit.     Juni Correia MD

## 2020-04-07 ENCOUNTER — VIRTUAL VISIT (OUTPATIENT)
Dept: FAMILY MEDICINE CLINIC | Age: 55
End: 2020-04-07

## 2020-04-07 DIAGNOSIS — R09.82 POST-NASAL DRIP: Primary | ICD-10-CM

## 2020-04-07 NOTE — PATIENT INSTRUCTIONS
Managing Your Allergies: Care Instructions Your Care Instructions Managing your allergies is an important part of staying healthy. Your doctor will help you find out what may be causing the allergies. Common causes of allergy symptoms are house dust and dust mites, animal dander, mold, and pollen. As soon as you know what triggers your symptoms, try to reduce your exposure to your triggers. This can help prevent allergy symptoms, asthma, and other health problems. Ask your doctor about allergy medicine or immunotherapy. These treatments may help reduce or prevent allergy symptoms. Follow-up care is a key part of your treatment and safety. Be sure to make and go to all appointments, and call your doctor if you are having problems. It's also a good idea to know your test results and keep a list of the medicines you take. How can you care for yourself at home? · If you think that dust or dust mites are causing your allergies: 
? Wash sheets, pillowcases, and other bedding every week in hot water. ? Use airtight, dust-proof covers for pillows, duvets, and mattresses. Avoid plastic covers, because they tend to tear quickly and do not \"breathe. \" Wash according to the instructions. ? Remove extra blankets and pillows that you don't need. ? Use blankets that are machine-washable. ? Don't use home humidifiers. They can help mites live longer. · Use air-conditioning. Change or clean all filters every month. Keep windows closed. Use high-efficiency air filters. Don't use window or attic fans, which draw dust into the air. · If you're allergic to pet dander, keep pets outside or, at the very least, out of your bedroom. Old carpet and cloth-covered furniture can hold a lot of animal dander. You may need to replace them. · Look for signs of cockroaches. Use cockroach baits to get rid of them. Then clean your home well.  
· If you're allergic to mold, don't keep indoor plants, because molds can grow in soil. Get rid of furniture, rugs, and drapes that smell musty. Check for mold in the bathroom. · If you're allergic to pollen, stay inside when pollen counts are high. · Don't smoke or let anyone else smoke in your house. Don't use fireplaces or wood-burning stoves. Avoid paint fumes, perfumes, and other strong odors. When should you call for help? Give an epinephrine shot if: 
  · You think you are having a severe allergic reaction.  
 After giving an epinephrine shot call  911, even if you feel better. 
 Call 911 if: 
  · You have symptoms of a severe allergic reaction. These may include: 
? Sudden raised, red areas (hives) all over your body. ? Swelling of the throat, mouth, lips, or tongue. ? Trouble breathing. ? Passing out (losing consciousness). Or you may feel very lightheaded or suddenly feel weak, confused, or restless.  
  · You have been given an epinephrine shot, even if you feel better.  
 Call your doctor now or seek immediate medical care if: 
  · You have symptoms of an allergic reaction, such as: ? A rash or hives (raised, red areas on the skin). ? Itching. ? Swelling. ? Belly pain, nausea, or vomiting.  
 Watch closely for changes in your health, and be sure to contact your doctor if: 
  · Your allergies get worse.  
  · You need help controlling your allergies.  
  · You have questions about allergy testing.  
  · You do not get better as expected. Where can you learn more? Go to http://guilherme-maximus.info/ Enter L249 in the search box to learn more about \"Managing Your Allergies: Care Instructions. \" Current as of: October 6, 2019Content Version: 12.4 © 0217-7400 Healthwise, Incorporated. Care instructions adapted under license by [employer] (which disclaims liability or warranty for this information).  If you have questions about a medical condition or this instruction, always ask your healthcare professional. [address] any warranty or liability for your use of this information.

## 2020-04-07 NOTE — PROGRESS NOTES
Consent: Judith Leary, who was seen by synchronous (real-time) audio-video technology, and/or her healthcare decision maker, is aware that this patient-initiated, Telehealth encounter on 4/7/2020 is a billable service, with coverage as determined by her insurance carrier. She is aware that she may receive a bill and has provided verbal consent to proceed: Yes. Assessment & Plan:   Diagnoses and all orders for this visit:    1. Post-nasal drip  -     azelas-fluticasone-NaCl-NaHCO3 137 mcg-50 mcg- 0.9 % ksps; 2 squirts to each nostril once daily for 1 week then 1 squirt to each nostril daily thereafter  If sxs persist, may benefit from a short course of steroids        Follow-up and Dispositions    · Return if symptoms worsen or fail to improve, otherwise RTC for previous appt. 712  Subjective:   Judith Leary is a 63 y.o. female who was seen for Sore Throat (2.5 weeks - Taking Mucinex D and Nyquil day)    2 and 1/2 weeks-could not take a deep breath, coughing, could not sleep  Saw Dr Alba Isaacs 1 week ago, advised to take Mucinex D; add Nyquil at night which helped her sleep  5 days ago feels great during the day but at night still has bad post nasal drip/sore throat    No hx of asthma  No fever    not sick      Prior to Admission medications    Medication Sig Start Date End Date Taking? Authorizing Provider   biotin 1,000 mcg chew Take  by mouth two (2) times a day. Yes Provider, Historical   cyanocobalamin (VITAMIN B12) 100 mcg tablet 100 mcg daily. B12 liquid daily   Yes Provider, Historical   dextromethorphan-guaiFENesin (Robitussin Cough-Chest Hugo DM) 5-100 mg/5 mL liqd Take 10 mL by mouth every six (6) hours. 3/27/20  Yes Justus Yañez MD   conjugated estrogens (PREMARIN) 0.3 mg tablet Take 1 Tab by mouth daily. 2/6/20  Yes Ignacio South MD   Cetirizine (ZYRTEC) 10 mg cap Take  by mouth.    Yes Provider, Historical     No Known Allergies      ROS as above in HPI, the rest are negative      Objective:     Visit Vitals  LMP 11/16/2006      General: alert, cooperative, no distress   Mental  status: normal mood, behavior, speech, dress, motor activity, and thought processes, able to follow commands   HENT: NCAT, nasal congestion noted   Neck: no visualized mass   Resp: no respiratory distress   Neuro: no gross deficits   Skin: no discoloration or lesions of concern on visible areas   Psychiatric: normal affect, consistent with stated mood     Additional exam findings: We discussed the expected course, resolution and complications of the diagnosis(es) in detail. Medication risks, benefits, costs, interactions, and alternatives were discussed as indicated. I advised her to contact the office if her condition worsens, changes or fails to improve as anticipated. She expressed understanding with the diagnosis(es) and plan. Harley Bustos is a 63 y.o. female being evaluated by a video visit encounter for concerns as above. A caregiver was present when appropriate. Due to this being a TeleHealth encounter (During [place] [address] public [facility] emergency), evaluation of the following organ systems was limited: Vitals/Constitutional/EENT/Resp/CV/GI//MS/Neuro/Skin/Heme-Lymph-Imm. Pursuant to the emergency declaration under the [facility], 1135 waiver authority and the [employer] Act, this Virtual  Visit was conducted, with patient's (and/or legal guardian's) consent, to reduce the patient's risk of exposure to COVID-19 and provide necessary medical care. Services were provided through a video synchronous discussion virtually to substitute for in-person clinic visit. Patient and provider were located at their individual homes.         Soraya Tamez MD

## 2020-04-07 NOTE — PROGRESS NOTES
Chief Complaint   Patient presents with    Sore Throat     2.5 weeks - Taking Mucinex D     She feels she should be feeling better- Pt states Did MyChart Video with Dr. Roberto Suárez last time- Still not any better-     1. Have you been to the ER, urgent care clinic since your last visit? Hospitalized since your last visit? NO    2. Have you seen or consulted any other health care providers outside of the [address] [place] [address] since your last visit? Include any pap smears or colon screening.  NO

## 2021-01-12 DIAGNOSIS — U07.1 COVID-19: Primary | ICD-10-CM

## 2021-01-12 RX ORDER — HYDROCODONE POLISTIREX AND CHLORPHENIRAMINE POLISTIREX 10; 8 MG/5ML; MG/5ML
1 SUSPENSION, EXTENDED RELEASE ORAL
Qty: 100 ML | Refills: 0 | Status: SHIPPED | OUTPATIENT
Start: 2021-01-12 | End: 2021-01-19

## 2021-03-10 ENCOUNTER — OFFICE VISIT (OUTPATIENT)
Dept: FAMILY MEDICINE CLINIC | Age: 56
End: 2021-03-10
Payer: OTHER GOVERNMENT

## 2021-03-10 VITALS
SYSTOLIC BLOOD PRESSURE: 120 MMHG | DIASTOLIC BLOOD PRESSURE: 73 MMHG | OXYGEN SATURATION: 97 % | HEART RATE: 63 BPM | HEIGHT: 69 IN | WEIGHT: 173 LBS | RESPIRATION RATE: 16 BRPM | BODY MASS INDEX: 25.62 KG/M2 | TEMPERATURE: 97.8 F

## 2021-03-10 DIAGNOSIS — F51.02 ADJUSTMENT INSOMNIA: ICD-10-CM

## 2021-03-10 DIAGNOSIS — C43.61 MALIGNANT MELANOMA OF RIGHT UPPER EXTREMITY INCLUDING SHOULDER (HCC): ICD-10-CM

## 2021-03-10 DIAGNOSIS — Z00.00 WELL WOMAN EXAM (NO GYNECOLOGICAL EXAM): Primary | ICD-10-CM

## 2021-03-10 DIAGNOSIS — Z86.16 HISTORY OF COVID-19: ICD-10-CM

## 2021-03-10 PROCEDURE — 99396 PREV VISIT EST AGE 40-64: CPT | Performed by: INTERNAL MEDICINE

## 2021-03-10 RX ORDER — TRAZODONE HYDROCHLORIDE 50 MG/1
50 TABLET ORAL
Qty: 30 TAB | Refills: 3 | Status: SHIPPED | OUTPATIENT
Start: 2021-03-10 | End: 2022-01-24

## 2021-03-10 NOTE — PROGRESS NOTES
Chief Complaint   Patient presents with    Physical     Patient not fasting    Sleep Problem     1. Have you been to the ER, urgent care clinic since your last visit? Hospitalized since your last visit? Yes, [facility]    2. Have you seen or consulted any other health care providers outside of the [address] [place] [address] since your last visit? Include any pap smears or colon screening.  No    Health Maintenance Due   Topic Date Due    Flu Vaccine (1) 09/01/2020

## 2021-03-10 NOTE — PROGRESS NOTES
Chief Complaint   Patient presents with    Physical     Patient not fasting    Sleep Problem       Pt is a 63y.o. year old female who presents for follow up of her chronic medical problems/annual wellness visit    Health Maintenance Due   Topic Date Due    Flu Vaccine (1)-not this season 09/01/2020       Had Covid in [address] sxs-body aches, no energy      Older sister shrunk in height    Off Premarin    Sees Derm-had melanoma on the right arm; recent shave biopsy on the left upper back-basal cell cancer-went to see plastic surgery for removal      Sleep issue-getting a divorce; just sold their house and looking for a new place  Melatonin gets her to sleep but wakes up in the middle of the night and cannot go back to sleep  Has never taken sleep pills before  A Drink does not even make her sleep  Currently going through counseling    Wt Readings from Last 3 Encounters:   03/10/21 173 lb (78.5 kg)   02/06/20 173 lb 9.6 oz (78.7 kg)   05/21/19 174 lb (78.9 kg)     ROS:    Pt denies: Wt loss, Fever/Chills, HA, Visual changes, Fatigue, Chest pain, SOB, ZIMMERMAN, Abd pain, N/V/D/C, Blood in stool or urine, Edema. Pertinent positive as above in HPI. All others were negative    Patient Active Problem List   Diagnosis Code    Bursitis of shoulder, left M75.52    Stress fracture of ankle M84.373A    Hyperlipidemia E78.5    TIA (transient ischemic attack) G45.9    Rotator cuff syndrome of right shoulder M75. 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    Malignant melanoma of right upper extremity including shoulder ([facility]) C43.61       Past Medical History:   Diagnosis Date    Arthritis     finger joints    Contact dermatitis and other eczema, due to unspecified cause     Hyperlipidemia 11/11/2013    Malignant melanoma of right upper extremity including shoulder ([address] 3/10/2021    Seizures ([address]     25 yrs ago / random    TIA (transient ischemic attack) 11/11/2013       Current Outpatient Medications   Medication Sig Dispense Refill    traZODone (DESYREL) 50 mg tablet Take 1 Tab by mouth nightly. 30 Tab 3    biotin 1,000 mcg chew Take  by mouth two (2) times a day.  cyanocobalamin (VITAMIN B12) 100 mcg tablet 100 mcg daily. B12 liquid daily      Cetirizine (ZYRTEC) 10 mg cap Take  by mouth. Social History     Tobacco Use   Smoking Status Former Smoker    Packs/day: 0.30    Years: 30.00    Pack years: 9.00    Types: Cigarettes   Smokeless Tobacco Never Used       No Known Allergies    Patient Labs were reviewed: yes      Patient Past Records were reviewed:  yes        Objective:     Vitals:    03/10/21 0907   BP: 120/73   Pulse: 63   Resp: 16   Temp: 97.8 °F (36.6 °C)   TempSrc: Temporal   SpO2: 97%   Weight: 173 lb (78.5 kg)   Height: 5' 9\" (1.753 m)     Body mass index is 25.55 kg/m². Exam:   Appearance: alert, well appearing,  oriented to person, place, and time, acyanotic, in no respiratory distress and well hydrated. HEENT:  NC/AT, pink conj, anicteric sclerae  Neck:  No cervical lymphadenopathy, no JVD, no thyromegaly, no carotid bruit  Heart:  RRR without M/R/G  Lungs:  CTAB, no rhonchi, rales, or wheezes with good air exchange   Abdomen:  Non-tender, pos bowel sounds, no hepatosplenomegaly  Ext:  No C/C/E    Skin: no rash, surgical scar on the left upper back is well healed  Neuro: no lateralizing signs, CNs II-XII intact      Assessment/ Plan:   Diagnoses and all orders for this visit:    1. Well woman exam (no gynecological exam)-Advised re: monthly self breast exam, dental prophylaxis Q 6 months, regular exercise, yearly eye exam, daily intake of Ca+D  -     CBC WITH AUTOMATED DIFF; Future  -     METABOLIC PANEL, COMPREHENSIVE; Future  -     LIPID PANEL; Future    2. Adjustment insomnia-advised to continue with counseling  -     traZODone (DESYREL) 50 mg tablet; Take 1 Tab by mouth nightly.     3. Malignant melanoma of right upper extremity including shoulder (HCC)-Derm following    4. History of Covid-advised she still needs to get her Covid vaccine    Follow-up and Dispositions    · Return in about 1 year (around 3/10/2022) for physical.             I have discussed the diagnosis with the patient and the intended plan as seen in the above orders. The patient has received an After-Visit Summary and questions were answered concerning future plans. Medication Side Effects and Warnings were discussed with patient: yes    Patient verbalized understanding of above instructions.     June Thomas MD  Internal Medicine  [facility]

## 2021-03-10 NOTE — PATIENT INSTRUCTIONS
Insomnia: Care Instructions  Your Care Instructions     Insomnia is the inability to sleep well. It is a common problem for most people at some time. Insomnia may make it hard for you to get to sleep, stay asleep, or sleep as long as you need to. This can make you tired and grouchy during the day. It can also make you forgetful, less effective at work, and unhappy. Insomnia can be caused by conditions such as depression or anxiety. Pain can also affect your ability to sleep. When these problems are solved, the insomnia usually clears up. But sometimes bad sleep habits can cause insomnia. If insomnia is affecting your work or your enjoyment of life, you can take steps to improve your sleep. Follow-up care is a key part of your treatment and safety. Be sure to make and go to all appointments, and call your doctor if you are having problems. It's also a good idea to know your test results and keep a list of the medicines you take. How can you care for yourself at home? What to avoid   · Do not have drinks with caffeine, such as coffee or black tea, for 8 hours before bed. · Do not smoke or use other types of tobacco near bedtime. Nicotine is a stimulant and can keep you awake. · Avoid drinking alcohol late in the evening, because it can cause you to wake in the middle of the night. · Do not eat a big meal close to bedtime. If you are hungry, eat a light snack. · Do not drink a lot of water close to bedtime, because the need to urinate may wake you up during the night. · Do not read or watch TV in bed. Use the bed only for sleeping and sexual activity. What to try   · Go to bed at the same time every night, and wake up at the same time every morning. Do not take naps during the day. · Keep your bedroom quiet, dark, and cool. · Sleep on a comfortable pillow and mattress. · If watching the clock makes you anxious, turn it facing away from you so you cannot see the time.   · If you worry when you lie down, start a worry book. Well before bedtime, write down your worries, and then set the book and your concerns aside. · Try meditation or other relaxation techniques before you go to bed. · If you cannot fall asleep, get up and go to another room until you feel sleepy. Do something relaxing. Repeat your bedtime routine before you go to bed again. · Make your house quiet and calm about an hour before bedtime. Turn down the lights, turn off the TV, log off the computer, and turn down the volume on music. This can help you relax after a busy day. When should you call for help? Watch closely for changes in your health, and be sure to contact your doctor if:    · Your efforts to improve your sleep do not work.     · Your insomnia gets worse.     · You have been feeling down, depressed, or hopeless or have lost interest in things that you usually enjoy. Where can you learn more? Go to http://www.gray.com/  Enter P513 in the search box to learn more about \"Insomnia: Care Instructions. \"  Current as of: December 16, 2019               Content Version: 12.6  © 0821-3908 Healthwise, Incorporated. Care instructions adapted under license by [employer] (which disclaims liability or warranty for this information). If you have questions about a medical condition or this instruction, always ask your healthcare professional. [place] [address] any warranty or liability for your use of this information. Well Visit, Women 48 to 72: Care Instructions  Your Care Instructions     Physical exams can help you stay healthy. Your doctor has checked your overall health and may have suggested ways to take good care of yourself. He or she also may have recommended tests. At home, you can help prevent illness with healthy eating, regular exercise, and other steps. Follow-up care is a key part of your treatment and safety.  Be sure to make and go to all appointments, and call your doctor if you are having problems. It's also a good idea to know your test results and keep a list of the medicines you take. How can you care for yourself at home? · Reach and stay at a healthy weight. This will lower your risk for many problems, such as obesity, diabetes, heart disease, and high blood pressure. · Get at least 30 minutes of exercise on most days of the week. Walking is a good choice. You also may want to do other activities, such as running, swimming, cycling, or playing tennis or team sports. · Do not smoke. Smoking can make health problems worse. If you need help quitting, talk to your doctor about stop-smoking programs and medicines. These can increase your chances of quitting for good. · Protect your skin from too much sun. When you're outdoors from 10 a.m. to 4 p.m., stay in the shade or cover up with clothing and a hat with a wide brim. Wear sunglasses that block UV rays. Even when it's cloudy, put broad-spectrum sunscreen (SPF 30 or higher) on any exposed skin. · See a dentist one or two times a year for checkups and to have your teeth cleaned. · Wear a seat belt in the car. Follow your doctor's advice about when to have certain tests. These tests can spot problems early. · Cholesterol. Your doctor will tell you how often to have this done based on your age, family history, or other things that can increase your risk for heart attack and stroke. · Blood pressure. Have your blood pressure checked during a routine doctor visit. Your doctor will tell you how often to check your blood pressure based on your age, your blood pressure results, and other factors. · Mammogram. Ask your doctor how often you should have a mammogram, which is an X-ray of your breasts. A mammogram can spot breast cancer before it can be felt and when it is easiest to treat.   · Pap test and pelvic exam. Ask your doctor how often you should have a Pap test. You may not need to have a Pap test as often as you used to. · Vision. Have your eyes checked every year or two or as often as your doctor suggests. Some experts recommend that you have yearly exams for glaucoma and other age-related eye problems starting at age 48. · Hearing. Tell your doctor if you notice any change in your hearing. You can have tests to find out how well you hear. · Diabetes. Ask your doctor whether you should have tests for diabetes. · Colorectal cancer. Your risk for colorectal cancer gets higher as you get older. Some experts say that adults should start regular screening at age 48 and stop at age 76. Others say to start before age 48 or continue after age 76. Talk with your doctor about your risk and when to start and stop screening. · Thyroid disease. Talk to your doctor about whether to have your thyroid checked as part of a regular physical exam. Women have an increased chance of a thyroid problem. · Osteoporosis. You should begin tests for bone density at age 72. If you are younger than 72, ask your doctor whether you have factors that may increase your risk for this disease. You may want to have this test before age 72. · Heart attack and stroke risk. At least every 4 to 6 years, you should have your risk for heart attack and stroke assessed. Your doctor uses factors such as your age, blood pressure, cholesterol, and whether you smoke or have diabetes to show what your risk for a heart attack or stroke is over the next 10 years. When should you call for help? Watch closely for changes in your health, and be sure to contact your doctor if you have any problems or symptoms that concern you. Where can you learn more? Go to http://guilherme-maximus.info/  Enter [id number] in the search box to learn more about \"Well Visit, Women 50 to 72: Care Instructions. \"  Current as of: May 27, 2020               Content Version: 12.6  © 4873-1091 [employer].    Care instructions adapted under license by [employer] (which disclaims liability or warranty for this information). If you have questions about a medical condition or this instruction, always ask your healthcare professional. [address] any warranty or liability for your use of this information.

## 2021-03-12 LAB
ALBUMIN SERPL-MCNC: 4.4 G/DL (ref 3.8–4.9)
ALBUMIN/GLOB SERPL: 1.9 {RATIO} (ref 1.2–2.2)
ALP SERPL-CCNC: 76 IU/L (ref 39–117)
ALT SERPL-CCNC: 20 IU/L (ref 0–32)
AST SERPL-CCNC: 22 IU/L (ref 0–40)
BASOPHILS # BLD AUTO: 0.1 X10E3/UL (ref 0–0.2)
BASOPHILS NFR BLD AUTO: 1 %
BILIRUB SERPL-MCNC: 0.9 MG/DL (ref 0–1.2)
BUN SERPL-MCNC: 10 MG/DL (ref 6–24)
BUN/CREAT SERPL: 15 (ref 9–23)
CALCIUM SERPL-MCNC: 9.7 MG/DL (ref 8.7–10.2)
CHLORIDE SERPL-SCNC: 103 MMOL/L (ref 96–106)
CHOLEST SERPL-MCNC: 212 MG/DL (ref 100–199)
CO2 SERPL-SCNC: 27 MMOL/L (ref 20–29)
CREAT SERPL-MCNC: 0.67 MG/DL (ref 0.57–1)
EOSINOPHIL # BLD AUTO: 0.2 X10E3/UL (ref 0–0.4)
EOSINOPHIL NFR BLD AUTO: 2 %
ERYTHROCYTE [DISTWIDTH] IN BLOOD BY AUTOMATED COUNT: 11.7 % (ref 11.7–15.4)
GLOBULIN SER CALC-MCNC: 2.3 G/DL (ref 1.5–4.5)
GLUCOSE SERPL-MCNC: 89 MG/DL (ref 65–99)
HCT VFR BLD AUTO: 43.6 % (ref 34–46.6)
HDLC SERPL-MCNC: 82 MG/DL
HGB BLD-MCNC: 14.3 G/DL (ref 11.1–15.9)
IMM GRANULOCYTES # BLD AUTO: 0 X10E3/UL (ref 0–0.1)
IMM GRANULOCYTES NFR BLD AUTO: 0 %
IMP & REVIEW OF LAB RESULTS: NORMAL
LDLC SERPL CALC-MCNC: 118 MG/DL (ref 0–99)
LYMPHOCYTES # BLD AUTO: 2.4 X10E3/UL (ref 0.7–3.1)
LYMPHOCYTES NFR BLD AUTO: 24 %
MCH RBC QN AUTO: 30.1 PG (ref 26.6–33)
MCHC RBC AUTO-ENTMCNC: 32.8 G/DL (ref 31.5–35.7)
MCV RBC AUTO: 92 FL (ref 79–97)
MONOCYTES # BLD AUTO: 0.9 X10E3/UL (ref 0.1–0.9)
MONOCYTES NFR BLD AUTO: 9 %
NEUTROPHILS # BLD AUTO: 6.3 X10E3/UL (ref 1.4–7)
NEUTROPHILS NFR BLD AUTO: 64 %
PLATELET # BLD AUTO: 258 X10E3/UL (ref 150–450)
POTASSIUM SERPL-SCNC: 4.9 MMOL/L (ref 3.5–5.2)
PROT SERPL-MCNC: 6.7 G/DL (ref 6–8.5)
RBC # BLD AUTO: 4.75 X10E6/UL (ref 3.77–5.28)
SODIUM SERPL-SCNC: 143 MMOL/L (ref 134–144)
TRIGL SERPL-MCNC: 68 MG/DL (ref 0–149)
VLDLC SERPL CALC-MCNC: 12 MG/DL (ref 5–40)
WBC # BLD AUTO: 10 X10E3/UL (ref 3.4–10.8)

## 2022-01-24 ENCOUNTER — HOSPITAL ENCOUNTER (OUTPATIENT)
Dept: LAB | Age: 57
Discharge: HOME | End: 2022-01-24
Payer: OTHER GOVERNMENT

## 2022-01-24 ENCOUNTER — OFFICE VISIT (OUTPATIENT)
Dept: FAMILY MEDICINE CLINIC | Age: 57
End: 2022-01-24
Payer: OTHER GOVERNMENT

## 2022-01-24 VITALS
HEIGHT: 69 IN | DIASTOLIC BLOOD PRESSURE: 72 MMHG | TEMPERATURE: 98.4 F | WEIGHT: 168.8 LBS | RESPIRATION RATE: 16 BRPM | OXYGEN SATURATION: 97 % | BODY MASS INDEX: 25 KG/M2 | HEART RATE: 64 BPM | SYSTOLIC BLOOD PRESSURE: 108 MMHG

## 2022-01-24 DIAGNOSIS — Z00.00 WELL WOMAN EXAM (NO GYNECOLOGICAL EXAM): Primary | ICD-10-CM

## 2022-01-24 DIAGNOSIS — R05.3 CHRONIC COUGH: ICD-10-CM

## 2022-01-24 DIAGNOSIS — F43.21 GRIEF: ICD-10-CM

## 2022-01-24 DIAGNOSIS — Z87.891 HISTORY OF SMOKING: ICD-10-CM

## 2022-01-24 DIAGNOSIS — L60.3 BRITTLE NAILS: ICD-10-CM

## 2022-01-24 DIAGNOSIS — Z12.31 ENCOUNTER FOR SCREENING MAMMOGRAM FOR MALIGNANT NEOPLASM OF BREAST: ICD-10-CM

## 2022-01-24 DIAGNOSIS — Z00.00 WELL WOMAN EXAM (NO GYNECOLOGICAL EXAM): ICD-10-CM

## 2022-01-24 LAB
ALBUMIN SERPL-MCNC: 4 G/DL (ref 3.4–5)
ALBUMIN/GLOB SERPL: 1.3 {RATIO} (ref 0.8–1.7)
ALP SERPL-CCNC: 91 U/L (ref 45–117)
ALT SERPL-CCNC: 23 U/L (ref 13–56)
ANION GAP SERPL CALC-SCNC: 4 MMOL/L (ref 3–18)
AST SERPL-CCNC: 16 U/L (ref 10–38)
BASOPHILS # BLD: 0.1 K/UL (ref 0–0.1)
BASOPHILS NFR BLD: 1 % (ref 0–2)
BILIRUB SERPL-MCNC: 1.4 MG/DL (ref 0.2–1)
BUN SERPL-MCNC: 9 MG/DL (ref 7–18)
BUN/CREAT SERPL: 11 (ref 12–20)
CALCIUM SERPL-MCNC: 9.7 MG/DL (ref 8.5–10.1)
CHLORIDE SERPL-SCNC: 104 MMOL/L (ref 100–111)
CHOLEST SERPL-MCNC: 235 MG/DL
CO2 SERPL-SCNC: 32 MMOL/L (ref 21–32)
CREAT SERPL-MCNC: 0.84 MG/DL (ref 0.6–1.3)
DIFFERENTIAL METHOD BLD: ABNORMAL
EOSINOPHIL # BLD: 0.1 K/UL (ref 0–0.4)
EOSINOPHIL NFR BLD: 2 % (ref 0–5)
ERYTHROCYTE [DISTWIDTH] IN BLOOD BY AUTOMATED COUNT: 11.8 % (ref 11.6–14.5)
FERRITIN SERPL-MCNC: 54 NG/ML (ref 8–388)
GLOBULIN SER CALC-MCNC: 3.2 G/DL (ref 2–4)
GLUCOSE SERPL-MCNC: 60 MG/DL (ref 74–99)
HCT VFR BLD AUTO: 47.6 % (ref 35–45)
HDLC SERPL-MCNC: 81 MG/DL (ref 40–60)
HDLC SERPL: 2.9 {RATIO} (ref 0–5)
HGB BLD-MCNC: 15.1 G/DL (ref 12–16)
IMM GRANULOCYTES # BLD AUTO: 0 K/UL (ref 0–0.04)
IMM GRANULOCYTES NFR BLD AUTO: 0 % (ref 0–0.5)
LDLC SERPL CALC-MCNC: 141.6 MG/DL (ref 0–100)
LIPID PROFILE,FLP: ABNORMAL
LYMPHOCYTES # BLD: 2.2 K/UL (ref 0.9–3.6)
LYMPHOCYTES NFR BLD: 25 % (ref 21–52)
MCH RBC QN AUTO: 29 PG (ref 24–34)
MCHC RBC AUTO-ENTMCNC: 31.7 G/DL (ref 31–37)
MCV RBC AUTO: 91.5 FL (ref 78–100)
MONOCYTES # BLD: 0.9 K/UL (ref 0.05–1.2)
MONOCYTES NFR BLD: 10 % (ref 3–10)
NEUTS SEG # BLD: 5.5 K/UL (ref 1.8–8)
NEUTS SEG NFR BLD: 63 % (ref 40–73)
NRBC # BLD: 0 K/UL (ref 0–0.01)
NRBC BLD-RTO: 0 PER 100 WBC
PLATELET # BLD AUTO: 294 K/UL (ref 135–420)
PMV BLD AUTO: 12.4 FL (ref 9.2–11.8)
POTASSIUM SERPL-SCNC: 4.3 MMOL/L (ref 3.5–5.5)
PROT SERPL-MCNC: 7.2 G/DL (ref 6.4–8.2)
RBC # BLD AUTO: 5.2 M/UL (ref 4.2–5.3)
SODIUM SERPL-SCNC: 140 MMOL/L (ref 136–145)
TRIGL SERPL-MCNC: 62 MG/DL (ref ?–150)
TSH SERPL DL<=0.05 MIU/L-ACNC: 1.34 UIU/ML (ref 0.36–3.74)
VLDLC SERPL CALC-MCNC: 12.4 MG/DL
WBC # BLD AUTO: 8.8 K/UL (ref 4.6–13.2)

## 2022-01-24 PROCEDURE — 82728 ASSAY OF FERRITIN: CPT

## 2022-01-24 PROCEDURE — 36415 COLL VENOUS BLD VENIPUNCTURE: CPT

## 2022-01-24 PROCEDURE — 80061 LIPID PANEL: CPT

## 2022-01-24 PROCEDURE — 84443 ASSAY THYROID STIM HORMONE: CPT

## 2022-01-24 PROCEDURE — 80053 COMPREHEN METABOLIC PANEL: CPT

## 2022-01-24 PROCEDURE — 85025 COMPLETE CBC W/AUTO DIFF WBC: CPT

## 2022-01-24 PROCEDURE — 99396 PREV VISIT EST AGE 40-64: CPT | Performed by: INTERNAL MEDICINE

## 2022-01-24 NOTE — PROGRESS NOTES
Patient seen for CPE with concerns of brittle nail. Would like iron and TSH level drawn. Reports seeking counseling for periods of irritability     1. Have you been to the ER, urgent care clinic since your last visit? Hospitalized since your last visit? No    2. Have you seen or consulted any other health care providers outside of the [address] [place] [address] since your last visit? Include any pap smears or colon screening.  Yes Orthopedic for knee and hip        Health Maintenance Due   Topic Date Due    COVID-19 Vaccine (2 - Pfizer 3-dose series) 05/05/2021    Breast Cancer Screen Mammogram  05/31/2021    Flu Vaccine (1) 09/01/2021     Patient declined flu vaccine

## 2022-01-24 NOTE — PROGRESS NOTES
Assessment/ Plan:   Diagnoses and all orders for this visit:    1. Well woman exam (no gynecological exam)  -     CBC WITH AUTOMATED DIFF; Future  -     METABOLIC PANEL, COMPREHENSIVE; Future  -     LIPID PANEL; Future  -     TSH 3RD GENERATION; Future  -     FERRITIN; Future  mammo ordered  Dexa in -normal  Patient walks regularly and also goes to the gym to exercise    2. Brittle nails-chronic; if labs normal, will consider sending her to Derm  -     TSH 3RD GENERATION; Future  -     FERRITIN; Future    3. Chronic cough/phlegm in throat-advised to try allergy meds  -     XR CHEST PA LAT; Future    4. History of smoking  -     XR CHEST PA LAT; Future  Quit about 15 yrs ago; prior to that smoked about 1ppd for around 20 yrs-will check to see if she qualifies for low dose lung CT      5. Grief-continue with counseling    6. Encounter for screening mammogram for malignant neoplasm of breast  -     [facility] MAMMO BI SCREENING INCL CAD;  Future        Follow-up and Dispositions    · Return in about 1 year (around 2023) for physical.                 Chief Complaint   Patient presents with    Well Woman    Nail Problem       Pt is a 61y.o. year old female who presents for follow up of her chronic medical problems    Health Maintenance Due   Topic Date Due    COVID-19 Vaccine (2 - Pfizer 3-dose series)-will get it sooner 2021    Breast Cancer Screen Mammogram-at [facility]  2021    Flu Vaccine (1)-declined 2021     Back to counseling bec of recent events-ex   recent;y and there is some friction with his kids regarding some money  Mom lives with her  Not taking Trazodone-only took a few pills when rxd to her;     Off Premarin-no further hot flashes    Nails brittle for years now  Tried all OTC remedies  Read it could be her Iron, Thyroid so she wants to get tested for these    Not fasting/ate breakfast      ROS:    Pt denies: Wt loss, Fever/Chills, HA, Visual changes, Fatigue, Chest pain, SOB, ZIMMERMAN, Abd pain, N/V/D/C, Blood in stool or urine, Edema. Pertinent positive as above in HPI. All others were negative    Patient Active Problem List   Diagnosis Code    Bursitis of shoulder, left M75.52    Stress fracture of ankle M84.373A    Hyperlipidemia E78.5    TIA (transient ischemic attack) G45.9    Rotator cuff syndrome of right shoulder M75. 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    Malignant melanoma of right upper extremity including shoulder ([facility]) C43.61    History of COVID-19 Z86.16       Past Medical History:   Diagnosis Date    Arthritis     finger joints    Contact dermatitis and other eczema, due to unspecified cause     Hyperlipidemia 11/11/2013    Malignant melanoma of right upper extremity including shoulder ([facility] [address] 3/10/2021    Seizures ([facility] [address]     25 yrs ago / random    TIA (transient ischemic attack) 11/11/2013       Current Outpatient Medications   Medication Sig Dispense Refill           biotin 1,000 mcg chew Take  by mouth two (2) times a day.  cyanocobalamin (VITAMIN B12) 100 mcg tablet 100 mcg daily. B12 liquid daily      Cetirizine (ZYRTEC) 10 mg cap Take  by mouth. Social History     Tobacco Use   Smoking Status Former Smoker    Packs/day: 0.30    Years: 30.00    Pack years: 9.00    Types: Cigarettes   Smokeless Tobacco Never Used       No Known Allergies    Patient Labs were reviewed: yes    Patient Past Records were reviewed: yes      Objective:     Vitals:    01/24/22 0841   BP: 108/72   Pulse: 64   Resp: 16   Temp: 98.4 °F (36.9 °C)   TempSrc: Temporal   SpO2: 97%   Weight: 168 lb 12.8 oz (76.6 kg)   Height: 5' 9\" (1.753 m)     Body mass index is 24.93 kg/m². Exam:   Appearance: alert, well appearing,  oriented to person, place, and time, acyanotic, in no respiratory distress and well hydrated.   HEENT:  NC/AT, pink conj, anicteric sclerae  Neck:  No cervical lymphadenopathy, no JVD, no thyromegaly, no carotid bruit  Heart:  RRR without M/R/G  Lungs:  CTAB, no rhonchi, rales, or wheezes with good air exchange   Abdomen:  Non-tender, pos bowel sounds, no hepatosplenomegaly  Ext:  No C/C/E    Skin: no rash  Neuro: no lateralizing signs, CNs II-XII intact            I have discussed the diagnosis with the patient and the intended plan as seen in the above orders. The patient has received an After-Visit Summary and questions were answered concerning future plans. Medication Side Effects and Warnings were discussed with patient: yes    Patient verbalized understanding of above instructions.     Kiki Green MD  Internal Medicine  [facility]

## 2022-01-24 NOTE — PATIENT INSTRUCTIONS

## 2022-03-18 PROBLEM — Z86.16 HISTORY OF COVID-19: Status: ACTIVE | Noted: 2021-03-10

## 2022-03-20 PROBLEM — C43.61 MALIGNANT MELANOMA OF RIGHT UPPER EXTREMITY INCLUDING SHOULDER (HCC): Status: ACTIVE | Noted: 2021-03-10

## 2022-03-21 ENCOUNTER — PATIENT MESSAGE (OUTPATIENT)
Dept: FAMILY MEDICINE CLINIC | Age: 57
End: 2022-03-21

## 2024-04-22 ENCOUNTER — OFFICE VISIT (OUTPATIENT)
Facility: CLINIC | Age: 59
End: 2024-04-22
Payer: OTHER GOVERNMENT

## 2024-04-22 ENCOUNTER — HOSPITAL ENCOUNTER (OUTPATIENT)
Facility: HOSPITAL | Age: 59
Setting detail: SPECIMEN
Discharge: HOME | End: 2024-04-25
Payer: OTHER GOVERNMENT

## 2024-04-22 VITALS
DIASTOLIC BLOOD PRESSURE: 76 MMHG | RESPIRATION RATE: 16 BRPM | BODY MASS INDEX: 27.58 KG/M2 | HEIGHT: 69 IN | HEART RATE: 66 BPM | TEMPERATURE: 98.2 F | OXYGEN SATURATION: 98 % | SYSTOLIC BLOOD PRESSURE: 134 MMHG | WEIGHT: 186.2 LBS

## 2024-04-22 DIAGNOSIS — R05.3 CHRONIC COUGH: ICD-10-CM

## 2024-04-22 DIAGNOSIS — Z12.4 SCREENING FOR CERVICAL CANCER: ICD-10-CM

## 2024-04-22 DIAGNOSIS — Z87.891 HISTORY OF SMOKING: ICD-10-CM

## 2024-04-22 DIAGNOSIS — E78.5 HYPERLIPIDEMIA, UNSPECIFIED HYPERLIPIDEMIA TYPE: ICD-10-CM

## 2024-04-22 DIAGNOSIS — C43.61 MALIGNANT MELANOMA OF RIGHT UPPER EXTREMITY INCLUDING SHOULDER (HCC): ICD-10-CM

## 2024-04-22 DIAGNOSIS — Z01.419 WELL WOMAN EXAM WITH ROUTINE GYNECOLOGICAL EXAM: Primary | ICD-10-CM

## 2024-04-22 PROCEDURE — 87624 HPV HI-RISK TYP POOLED RSLT: CPT

## 2024-04-22 PROCEDURE — 99396 PREV VISIT EST AGE 40-64: CPT | Performed by: INTERNAL MEDICINE

## 2024-04-22 PROCEDURE — 88175 CYTOPATH C/V AUTO FLUID REDO: CPT

## 2024-04-22 SDOH — ECONOMIC STABILITY: FOOD INSECURITY: WITHIN THE PAST 12 MONTHS, YOU WORRIED THAT YOUR FOOD WOULD RUN OUT BEFORE YOU GOT MONEY TO BUY MORE.: NEVER TRUE

## 2024-04-22 SDOH — ECONOMIC STABILITY: HOUSING INSECURITY
IN THE LAST 12 MONTHS, WAS THERE A TIME WHEN YOU DID NOT HAVE A STEADY PLACE TO SLEEP OR SLEPT IN A SHELTER (INCLUDING NOW)?: NO

## 2024-04-22 SDOH — ECONOMIC STABILITY: FOOD INSECURITY: WITHIN THE PAST 12 MONTHS, THE FOOD YOU BOUGHT JUST DIDN'T LAST AND YOU DIDN'T HAVE MONEY TO GET MORE.: NEVER TRUE

## 2024-04-22 SDOH — ECONOMIC STABILITY: INCOME INSECURITY: HOW HARD IS IT FOR YOU TO PAY FOR THE VERY BASICS LIKE FOOD, HOUSING, MEDICAL CARE, AND HEATING?: NOT HARD AT ALL

## 2024-04-22 ASSESSMENT — PATIENT HEALTH QUESTIONNAIRE - PHQ9
2. FEELING DOWN, DEPRESSED OR HOPELESS: NOT AT ALL
SUM OF ALL RESPONSES TO PHQ QUESTIONS 1-9: 0
SUM OF ALL RESPONSES TO PHQ9 QUESTIONS 1 & 2: 0
SUM OF ALL RESPONSES TO PHQ QUESTIONS 1-9: 0
1. LITTLE INTEREST OR PLEASURE IN DOING THINGS: NOT AT ALL
SUM OF ALL RESPONSES TO PHQ QUESTIONS 1-9: 0
SUM OF ALL RESPONSES TO PHQ QUESTIONS 1-9: 0

## 2024-04-22 NOTE — PROGRESS NOTES
\"Have you been to the ER, urgent care clinic since your last visit?  Hospitalized since your last visit?\"    NO    “Have you seen or consulted any other health care providers outside of [facility] since your last visit?”    NO     “Have you had a pap smear?”    NO    Date of last Cervical Cancer screen (HPV or PAP): 11/21/2016             Click Here for Release of Records Request

## 2024-04-22 NOTE — PROGRESS NOTES
Assessment/ Plan:   Sadie was seen today for annual exam.    Diagnoses and all orders for this visit:    Well woman exam with routine gynecological exam-Advised re: monthly self breast exam, dental prophylaxis Q 6 months, regular exercise, yearly eye exam, daily intake of Ca+D  -     PAP IG, Aptima HPV and rfx 16/18,45 ([id number]); Future  Advised on vaccines needed  Mammo up to date    Hyperlipidemia, unspecified hyperlipidemia type-low cholesterol diet advised  and will determine ASCVD risk with next labs to see if she will benefit from taking a statin     Chronic cough  -     External Referral To Pulmonology    History of smoking  -     External Referral To Pulmonology    Screening for cervical cancer  -     PAP IG, Aptima HPV and rfx 16/18,45 (199305); Future    Malignant melanoma of right upper extremity including shoulder (HCC)-Onc and Derm following        Follow-up and Dispositions    Return in about 1 year (around 4/22/2025) for annual wellness visit, fasting labs in 1-2 months.                     Chief Complaint   Patient presents with    Annual Exam       Pt is a 59 y.o. year old female who presents for follow up of her chronic medical problems/annual wellness visit    Health Maintenance Due   Topic Date Due    Depression Screen -denies Never done    HIV screen -declined  Never done    Hepatitis C screen -neg in 2008 Never done    Cervical cancer screen -today 11/21/2021    COVID-19 Vaccine (2 - 2023-24 season) 09/01/2023    Respiratory Syncytial Virus (RSV) Pregnant or age 60 yrs+ (1 - 1-dose 60+ series) Never done      Wt Readings from Last 3 Encounters:   04/22/24 84.5 kg (186 lb 3.2 oz)   01/24/22 76.6 kg (168 lb 12.8 oz)   03/10/21 78.5 kg (173 lb)    Stress!    BP Readings from Last 3 Encounters:   04/22/24 134/76   01/24/22 108/72   03/10/21 120/73      ?fasting    Broke her left foot-misstep      Phlegm in throat-chronic, embarrassing at times; worse when talking; all year round  Hx of smoking for

## 2025-05-23 ENCOUNTER — TELEPHONE (OUTPATIENT)
Facility: CLINIC | Age: 60
End: 2025-05-23

## 2025-05-23 NOTE — TELEPHONE ENCOUNTER
----- Message from Frances BREWSTER sent at 5/23/2025 10:09 AM EDT -----  Regarding: [facility] Appointment Request  [facility] Appointment Request    Patient needs appointment for [facility] Appointment Type: Pre-Op Visit/June 16, 2025 for right shoulder      Patient Requested Dates(s):  June 4, 5 or 6, 2025  Patient Requested Time:  Morning schedule   Provider Name:  Patrica Engel MD    Reason for Appointment Request: Established Patient - Available appointments did not meet patient need  --------------------------------------------------------------------------------------------------------------------------    Relationship to Patient: Covered Entity - [facility] - surgeons office     Call Back Information: OK to leave message on voicemail  Preferred Call Back Number: [phone]  //  [phone]

## 2025-06-25 ENCOUNTER — OFFICE VISIT (OUTPATIENT)
Facility: CLINIC | Age: 60
End: 2025-06-25
Payer: OTHER GOVERNMENT

## 2025-06-25 VITALS
WEIGHT: 179 LBS | SYSTOLIC BLOOD PRESSURE: 118 MMHG | TEMPERATURE: 97.6 F | HEART RATE: 64 BPM | DIASTOLIC BLOOD PRESSURE: 71 MMHG | OXYGEN SATURATION: 97 % | RESPIRATION RATE: 16 BRPM | HEIGHT: 69 IN | BODY MASS INDEX: 26.51 KG/M2

## 2025-06-25 DIAGNOSIS — E78.5 HYPERLIPIDEMIA, UNSPECIFIED HYPERLIPIDEMIA TYPE: ICD-10-CM

## 2025-06-25 DIAGNOSIS — R05.3 CHRONIC COUGH: ICD-10-CM

## 2025-06-25 DIAGNOSIS — Z87.891 HISTORY OF SMOKING: ICD-10-CM

## 2025-06-25 DIAGNOSIS — M79.604 RIGHT LEG PAIN: ICD-10-CM

## 2025-06-25 DIAGNOSIS — H00.015 HORDEOLUM EXTERNUM OF LEFT LOWER EYELID: ICD-10-CM

## 2025-06-25 DIAGNOSIS — Z00.00 WELL WOMAN EXAM (NO GYNECOLOGICAL EXAM): Primary | ICD-10-CM

## 2025-06-25 DIAGNOSIS — Z12.11 SCREENING FOR COLON CANCER: ICD-10-CM

## 2025-06-25 PROCEDURE — 99396 PREV VISIT EST AGE 40-64: CPT | Performed by: INTERNAL MEDICINE

## 2025-06-25 RX ORDER — CIPROFLOXACIN HYDROCHLORIDE 3.5 MG/ML
1 SOLUTION/ DROPS TOPICAL EVERY 6 HOURS
Qty: 5 ML | Refills: 0 | Status: SHIPPED | OUTPATIENT
Start: 2025-06-25 | End: 2025-07-02

## 2025-06-25 RX ORDER — CELECOXIB 200 MG/1
200 CAPSULE ORAL DAILY
COMMUNITY

## 2025-06-25 RX ORDER — ASPIRIN 81 MG/1
81 TABLET ORAL DAILY
COMMUNITY

## 2025-06-25 SDOH — ECONOMIC STABILITY: FOOD INSECURITY: WITHIN THE PAST 12 MONTHS, YOU WORRIED THAT YOUR FOOD WOULD RUN OUT BEFORE YOU GOT MONEY TO BUY MORE.: NEVER TRUE

## 2025-06-25 SDOH — ECONOMIC STABILITY: FOOD INSECURITY: WITHIN THE PAST 12 MONTHS, THE FOOD YOU BOUGHT JUST DIDN'T LAST AND YOU DIDN'T HAVE MONEY TO GET MORE.: NEVER TRUE

## 2025-06-25 ASSESSMENT — PATIENT HEALTH QUESTIONNAIRE - PHQ9
SUM OF ALL RESPONSES TO PHQ QUESTIONS 1-9: 0
1. LITTLE INTEREST OR PLEASURE IN DOING THINGS: NOT AT ALL
2. FEELING DOWN, DEPRESSED OR HOPELESS: NOT AT ALL
SUM OF ALL RESPONSES TO PHQ QUESTIONS 1-9: 0

## 2025-06-25 NOTE — PROGRESS NOTES
Assessment/ Plan:   Sadie was seen today for annual exam.    Diagnoses and all orders for this visit:    Well woman exam (no gynecological exam)-Advised re: monthly self breast exam, dental prophylaxis Q 6 months, regular exercise, yearly eye exam, daily intake of Ca+D  Cologuard ordered  Fasting labs ordered  -     CBC; Future  -     Comprehensive Metabolic Panel; Future  -     Hemoglobin A1C; Future  -     Lipid Panel; Future    Hordeolum externum of left lower eyelid-suspect this is a chalazion so if no better with warm compress and antibiotic eye drops, she will see her eye doc  -     ciprofloxacin (CILOXAN) 0.3 % ophthalmic solution; Place 1 drop into the left eye every 6 hours for 7 days    Right leg pain-suspect venous as she does not have claudication  -     External Referral To Vascular Surgery    Chronic cough-will follow up with Pulmo in August    History of smoking-Pulmo orders her low dose lung Ct screen    Hyperlipidemia, unspecified hyperlipidemia type-low cholesterol diet advised  and will determine ASCVD risk with next labs to see if she will benefit from taking a statin     Screening for colon cancer  -     Cologuard (Fecal DNA Colorectal Cancer Screening)        Follow-up and Dispositions    Return in about 1 year (around 6/25/2026) for physical.                 Chief Complaint   Patient presents with    Annual Exam       Pt is a 60 y.o. year old female who presents for follow up of her chronic medical problems/annual exam    Health Maintenance Due   Topic Date Due    HIV screen  Never done    Diabetes screen  Never done    Pneumococcal 50+ years Vaccine (1 of 1 - PCV) Never done    Colorectal Cancer Screen -cologuard 02/26/2023    COVID-19 Vaccine (3 - 2024-25 season) 09/01/2024    Depression Screen  04/22/2025          Wt Readings from Last 3 Encounters:   06/25/25 81.2 kg (179 lb)   04/22/24 84.5 kg (186 lb 3.2 oz)   01/24/22 76.6 kg (168 lb 12.8 oz)    Got up to low 190's  Online semaglutide

## 2025-06-25 NOTE — PROGRESS NOTES
Have you been to the ER, urgent care clinic since your last visit?  Hospitalized since your last visit?   Yes- Kate Trinidad 5/2025 Dx Right shoulder pain  Have you seen or consulted any other health care providers outside our system since your last visit?   Yes- Dr. Jeb Diallo for shoulder surgery.  Dr. Duong -last year pulmonary; will see for yearly follow up next week    “Have you had a colorectal cancer screening such as a colonoscopy/FIT/Cologuard?    NO    Date of last Colonoscopy: 4/15/2015  Date of last Cologuard: 2/26/2020  No FIT/FOBT on file   No flexible sigmoidoscopy on file

## 2025-07-02 LAB
A/G RATIO: 1.7 RATIO (ref 1.1–2.6)
ALBUMIN: 4.5 G/DL (ref 3.5–5)
ALP BLD-CCNC: 75 U/L (ref 40–120)
ALT SERPL-CCNC: 12 U/L (ref 5–40)
ANION GAP SERPL CALCULATED.3IONS-SCNC: 13 MMOL/L (ref 3–15)
AST SERPL-CCNC: 20 U/L (ref 10–37)
BILIRUB SERPL-MCNC: 1.1 MG/DL (ref 0.2–1.2)
BUN BLDV-MCNC: 15 MG/DL (ref 6–22)
CALCIUM SERPL-MCNC: 10.3 MG/DL (ref 8.4–10.5)
CHLORIDE BLD-SCNC: 102 MMOL/L (ref 98–110)
CO2: 25 MMOL/L (ref 20–32)
CREAT SERPL-MCNC: 0.7 MG/DL (ref 0.8–1.4)
GFR, ESTIMATED: >90 ML/MIN/1.73 SQ.M.
GLOBULIN: 2.6 G/DL (ref 2–4)
GLUCOSE: 86 MG/DL (ref 70–99)
HCT VFR BLD CALC: 42.5 % (ref 35.1–48)
HEMOGLOBIN: 14.5 G/DL (ref 11.7–16)
MCH RBC QN AUTO: 31 PG (ref 26–34)
MCHC RBC AUTO-ENTMCNC: 34 G/DL (ref 31–36)
MCV RBC AUTO: 89 FL (ref 80–99)
PDW BLD-RTO: 11.5 % (ref 10–15.5)
PLATELET # BLD: 282 K/UL (ref 140–440)
PMV BLD AUTO: 12.1 FL (ref 9–13)
POTASSIUM SERPL-SCNC: 4.9 MMOL/L (ref 3.5–5.5)
RBC # BLD: 4.76 M/UL (ref 3.8–5.2)
SODIUM BLD-SCNC: 140 MMOL/L (ref 133–145)
TOTAL PROTEIN: 7.1 G/DL (ref 6.2–8.1)
WBC # BLD: 7.5 K/UL (ref 4–11)

## 2025-07-03 LAB
CHOLESTEROL, TOTAL: 207 MG/DL (ref 110–200)
CHOLESTEROL/HDL RATIO: 2.7 (ref 0–5)
ESTIMATED AVERAGE GLUCOSE: 101 MG/DL (ref 91–123)
HBA1C MFR BLD: 5.1 % (ref 4.8–5.6)
HDLC SERPL-MCNC: 76 MG/DL
LDL CHOLESTEROL: 120 MG/DL (ref 50–99)
LDL/HDL RATIO: 1.6
NON-HDL CHOLESTEROL: 131 MG/DL
TRIGL SERPL-MCNC: 52 MG/DL (ref 40–149)
VLDLC SERPL CALC-MCNC: 10 MG/DL (ref 8–30)

## 2025-07-06 ENCOUNTER — RESULTS FOLLOW-UP (OUTPATIENT)
Facility: CLINIC | Age: 60
End: 2025-07-06

## 2025-07-10 LAB — NONINV COLON CA DNA+OCC BLD SCRN STL QL: NEGATIVE
